# Patient Record
Sex: MALE | Race: WHITE | HISPANIC OR LATINO | Employment: OTHER | URBAN - METROPOLITAN AREA
[De-identification: names, ages, dates, MRNs, and addresses within clinical notes are randomized per-mention and may not be internally consistent; named-entity substitution may affect disease eponyms.]

---

## 2021-02-24 ENCOUNTER — TELEPHONE (OUTPATIENT)
Dept: SCHEDULING | Facility: IMAGING CENTER | Age: 26
End: 2021-02-24

## 2021-03-01 ENCOUNTER — OFFICE VISIT (OUTPATIENT)
Dept: MEDICAL GROUP | Facility: PHYSICIAN GROUP | Age: 26
End: 2021-03-01
Payer: COMMERCIAL

## 2021-03-01 VITALS
HEART RATE: 96 BPM | OXYGEN SATURATION: 98 % | SYSTOLIC BLOOD PRESSURE: 118 MMHG | WEIGHT: 161 LBS | BODY MASS INDEX: 23.1 KG/M2 | DIASTOLIC BLOOD PRESSURE: 74 MMHG | TEMPERATURE: 97.8 F

## 2021-03-01 DIAGNOSIS — F84.0 AUTISM DISORDER: ICD-10-CM

## 2021-03-01 DIAGNOSIS — L70.5 EXCORIATED ACNE: ICD-10-CM

## 2021-03-01 DIAGNOSIS — Z87.898 HISTORY OF SEIZURE: ICD-10-CM

## 2021-03-01 DIAGNOSIS — L70.0 ACNE VULGARIS: ICD-10-CM

## 2021-03-01 DIAGNOSIS — Z13.228 SCREENING FOR METABOLIC DISORDER: ICD-10-CM

## 2021-03-01 PROCEDURE — 99203 OFFICE O/P NEW LOW 30 MIN: CPT | Performed by: STUDENT IN AN ORGANIZED HEALTH CARE EDUCATION/TRAINING PROGRAM

## 2021-03-01 RX ORDER — TRETINOIN 0.5 MG/G
CREAM TOPICAL
Qty: 20 G | Refills: 1 | Status: SHIPPED | OUTPATIENT
Start: 2021-03-01

## 2021-03-01 NOTE — LETTER
[employer] [facility]  Jesus Manuel Matos M.D.  [address] [facility] [address]  [place] [address]  Fax: [phone]   Authorization for Release/Disclosure of   Protected Health Information   Name: NITIN ESPINOZAVALENTINA : 1995 SSN: [id number]   Address: [address] [place] [address] Phone:    [phone] (home)    I authorize the entity listed below to release/disclose the PHI below to:   [facility]/Jesus Manuel Matos M.D. and Sj Leong M.D.   Provider or Entity Name:   Dr. Jesus Manuel Matos   Address   City, [facility], [facility] Phone:      Fax:     Reason for request: continuity of care   Information to be released:    [  ] LAST COLONOSCOPY,  including any PATH REPORT and follow-up  [  ] LAST FIT/COLOGUARD RESULT [  ] LAST DEXA  [  ] LAST MAMMOGRAM  [  ] LAST PAP  [  ] LAST LABS [  ] RETINA EXAM REPORT  [  ] IMMUNIZATION RECORDS  [x] Release- Last 3 office notes.      [  ] Check here and initial the line next to each item to release ALL health information INCLUDING  _____ Care and treatment for drug and / or alcohol abuse  _____ HIV testing, infection status, or AIDS  _____ Genetic Testing    DATES OF SERVICE OR TIME PERIOD TO BE DISCLOSED: _Last 3 notes_____  I understand and acknowledge that:  * This Authorization may be revoked at any time by you in writing, except if your health information has already been used or disclosed.  * Your health information that will be used or disclosed as a result of you signing this authorization could be re-disclosed by the recipient. If this occurs, your re-disclosed health information may no longer be protected by State or Federal laws.  * You may refuse to sign this Authorization. Your refusal will not affect your ability to obtain treatment.  * This Authorization becomes effective upon signing and will  on (date) __________.      If no date is indicated, this Authorization will  one (1) year from the signature date.    Name: Nitin Aguilar    Signature:   Date:          3/1/2021       PLEASE FAX REQUESTED RECORDS BACK TO: [phone]

## 2021-03-01 NOTE — ASSESSMENT & PLAN NOTE
His mother notes that he has previously had seizures, that she describes as general shaking.  However, she also notes that he has had probable headaches, before the onset of this, as he would occasionally use hand signals to indicate that he would want some pain medication (aspirin or tylenol), prior to the onset of the shaking.  He is not currently on any antiseizure medication, and is on risperidone.  They have tried to get into neurology, but has had some difficulty, making appointments and would appreciate a new referral.

## 2021-03-01 NOTE — ASSESSMENT & PLAN NOTE
Patient is currently managed by psychiatry: Dr. Serrano.  He is currently on risperidone, through their office.  The patient is nonverbal, but makes humming noises periodically.  He also tends to use hand signals, to indicate what he wants (to his mother).

## 2021-03-01 NOTE — PROGRESS NOTES
New to Provider (and [facility])      Reason to establish: New patient to establish  Chief Complaint   Patient presents with   • Establish Care   • Acne     tretinoin          HPI:   Cruzito Aguilar is a 25 y.o. male, with autism.  He is accompanied by his mother, and both of his parents are listed as his legal guardians, and legal documents that his mother brought with them.    Prior PCP:  Dr. Jesus Manuel Matos.  ... However, they note that have not seen him for several years, as they had moved to [place], and never really established with a provider out there, but when they returned to [place], were following up with psychiatry (Dr. Serrano).  He has not had other PCP, since Dr. Dinero.      Autism disorder  Patient is currently managed by psychiatry: Dr. Serrano.  He is currently on risperidone, through their office.  The patient is nonverbal, but makes humming noises periodically.  He also tends to use hand signals, to indicate what he wants (to his mother).    History of seizure  His mother notes that he has previously had seizures, that she describes as general shaking.  However, she also notes that he has had probable headaches, before the onset of this, as he would occasionally use hand signals to indicate that he would want some pain medication (aspirin or tylenol), prior to the onset of the shaking.  He is not currently on any antiseizure medication, and is on risperidone.  They have tried to get into neurology, but has had some difficulty, making appointments and would appreciate a new referral.    Acne vulgaris  Patient has recently been on Retin-A, for acne vulgaris.  In office, is noticed on his forehead.  His mother notes that he has been on it, for a while.  However, whenever he stops it, it returns.  There are some mild excoriation marks on it as well.       Review of Symptoms  Not able to be done, by the patient,   given his mental status and medical condition.        Past Medical  History:   Diagnosis Date   • Autism     non verbal since age 2 1/2 years    • History of seizure        History reviewed. No pertinent surgical history.      Family History   Problem Relation Age of Onset   • Hypertension Father    • Diabetes Maternal Grandmother        Social History     Tobacco Use   • Smoking status: Never Smoker   • Smokeless tobacco: Never Used   Substance Use Topics   • Alcohol use: No       Current Outpatient Medications   Medication Sig Dispense Refill   • tretinoin (RETIN-A) 0.05 % cream Apply small amount to acne on face at night. 20 g 1   • Risperidone (RISPERDAL PO) Take  by mouth. 1 mg AM,  3 mg PM - by Psychiatrist.       No current facility-administered medications for this visit.       Allergies as of 03/01/2021   • (No Known Allergies)       Physical Exam  /74 (BP Location: Right arm, Patient Position: Sitting, BP Cuff Size: Adult)   Pulse 96   Temp 36.6 °C (97.8 °F) (Temporal)   Wt 73 kg (161 lb)   SpO2 98%   BMI 23.10 kg/m²   General:  Alert and oriented, No apparent distress.  Eyes:    EOMI (but not following instructions).  No scleral icterus.    Neck:  Supple.  Thyroid not enlarged.   Lungs: Clear to auscultation bilaterally.  No wheezes or rales.  Cardiovascular: Regular rate and rhythm.  No murmurs, rubs or gallops.  Abdomen:  Soft.  Non-tender.   - Large, but not distended.   Psychological: + Nonverbal.  Patient uses hand gestures to indicate to his mother what he wants.  He is fairly well mannered, but with a few humming noises occasionally.  Skin:  + Mild-moderate acne on forehead.  Couple possible scabs/excoriation marks, near the regions.      Labs: No recent labs to review.          Assessment & Plan    1. Autism disorder  Patient with autism and nonverbal.  History by mother.  Currently managed by psychiatry (Dr. Serrano), on risperidone.  Psychiatry to continue to manage.    2. History of seizure  Patient with vague history of seizures.  Will request  outside records, for further evaluation, but it sounds like either seizures, or complex migraine, as he occasionally asks (gestures) for aspirin or Tylenol, prior to the onset of seizures.  Currently on risperidone, which might decrease the threshold for seizures.  Will refer to neurology for further evaluation and management of this issue.  - REFERRAL TO NEUROLOGY  - CBC WITH DIFFERENTIAL; Future  - Comp Metabolic Panel; Future    3. Screening for metabolic disorder  No recent labs, and patient nonverbal to note good ROS, or screening.  Will obtain basic labs, to evaluate for further underlying issues and disorders.  - CBC WITH DIFFERENTIAL; Future  - Comp Metabolic Panel; Future  - Lipid Profile; Future    4. Acne vulgaris  5. Excoriated acne  Patient has acne, and had been fairly well managed on Retin-A in the past.  But, he breaks out, when medication discontinued.  There are some mild excoriation marks and scabbing, to the acne, suggesting that he had scratched them when a problem.  Will refill Retin-A (for now), and refer to dermatology for further evaluation and management.  - tretinoin (RETIN-A) 0.05 % cream; Apply small amount to acne on face at night.  Dispense: 20 g; Refill: 1  - REFERRAL TO DERMATOLOGY      Health Maintenance Review  To be completed on patient's annual / preventative visit.      A computerized dictation system may have been used in this note.    Despite review, there may be some spelling or grammatical errors.    Sj Leong M.D.  3/1/2021

## 2021-03-01 NOTE — ASSESSMENT & PLAN NOTE
Patient has recently been on Retin-A, for acne vulgaris.  In office, is noticed on his forehead.  His mother notes that he has been on it, for a while.  However, whenever he stops it, it returns.  There are some mild excoriation marks on it as well.

## 2021-03-16 LAB
ALBUMIN SERPL-MCNC: 5.1 G/DL (ref 4.1–5.2)
ALBUMIN/GLOB SERPL: 2 {RATIO} (ref 1.2–2.2)
ALP SERPL-CCNC: 63 IU/L (ref 39–117)
ALT SERPL-CCNC: 18 IU/L (ref 0–44)
AST SERPL-CCNC: 15 IU/L (ref 0–40)
BASOPHILS # BLD AUTO: 0 X10E3/UL (ref 0–0.2)
BASOPHILS NFR BLD AUTO: 0 %
BILIRUB SERPL-MCNC: 0.4 MG/DL (ref 0–1.2)
BUN SERPL-MCNC: 14 MG/DL (ref 6–20)
BUN/CREAT SERPL: 14 (ref 9–20)
CALCIUM SERPL-MCNC: 9.6 MG/DL (ref 8.7–10.2)
CHLORIDE SERPL-SCNC: 105 MMOL/L (ref 96–106)
CHOLEST SERPL-MCNC: 152 MG/DL (ref 100–199)
CO2 SERPL-SCNC: 20 MMOL/L (ref 20–29)
CREAT SERPL-MCNC: 1.02 MG/DL (ref 0.76–1.27)
EOSINOPHIL # BLD AUTO: 0.5 X10E3/UL (ref 0–0.4)
EOSINOPHIL NFR BLD AUTO: 6 %
ERYTHROCYTE [DISTWIDTH] IN BLOOD BY AUTOMATED COUNT: 12.3 % (ref 11.6–15.4)
GLOBULIN SER CALC-MCNC: 2.6 G/DL (ref 1.5–4.5)
GLUCOSE SERPL-MCNC: 83 MG/DL (ref 65–99)
HCT VFR BLD AUTO: 46.4 % (ref 37.5–51)
HDLC SERPL-MCNC: 37 MG/DL
HGB BLD-MCNC: 15.5 G/DL (ref 13–17.7)
IMM GRANULOCYTES # BLD AUTO: 0 X10E3/UL (ref 0–0.1)
IMM GRANULOCYTES NFR BLD AUTO: 0 %
IMMATURE CELLS  115398: ABNORMAL
LABORATORY COMMENT REPORT: ABNORMAL
LDLC SERPL CALC-MCNC: 82 MG/DL (ref 0–99)
LYMPHOCYTES # BLD AUTO: 2.4 X10E3/UL (ref 0.7–3.1)
LYMPHOCYTES NFR BLD AUTO: 28 %
MCH RBC QN AUTO: 28.3 PG (ref 26.6–33)
MCHC RBC AUTO-ENTMCNC: 33.4 G/DL (ref 31.5–35.7)
MCV RBC AUTO: 85 FL (ref 79–97)
MONOCYTES # BLD AUTO: 0.7 X10E3/UL (ref 0.1–0.9)
MONOCYTES NFR BLD AUTO: 8 %
MORPHOLOGY BLD-IMP: ABNORMAL
NEUTROPHILS # BLD AUTO: 5 X10E3/UL (ref 1.4–7)
NEUTROPHILS NFR BLD AUTO: 58 %
NRBC BLD AUTO-RTO: ABNORMAL %
PLATELET # BLD AUTO: 278 X10E3/UL (ref 150–450)
POTASSIUM SERPL-SCNC: 4.1 MMOL/L (ref 3.5–5.2)
PROT SERPL-MCNC: 7.7 G/DL (ref 6–8.5)
RBC # BLD AUTO: 5.48 X10E6/UL (ref 4.14–5.8)
SODIUM SERPL-SCNC: 140 MMOL/L (ref 134–144)
TRIGL SERPL-MCNC: 197 MG/DL (ref 0–149)
VLDLC SERPL CALC-MCNC: 33 MG/DL (ref 5–40)
WBC # BLD AUTO: 8.6 X10E3/UL (ref 3.4–10.8)

## 2021-04-30 ENCOUNTER — OFFICE VISIT (OUTPATIENT)
Dept: MEDICAL GROUP | Facility: PHYSICIAN GROUP | Age: 26
End: 2021-04-30
Payer: COMMERCIAL

## 2021-04-30 VITALS
BODY MASS INDEX: 22.68 KG/M2 | HEIGHT: 71 IN | HEART RATE: 82 BPM | TEMPERATURE: 97.8 F | WEIGHT: 162 LBS | OXYGEN SATURATION: 98 % | SYSTOLIC BLOOD PRESSURE: 118 MMHG | DIASTOLIC BLOOD PRESSURE: 68 MMHG

## 2021-04-30 DIAGNOSIS — E78.2 MIXED HYPERLIPIDEMIA: ICD-10-CM

## 2021-04-30 DIAGNOSIS — Z02.89 ENCOUNTER FOR COMPLETION OF FORM WITH PATIENT: ICD-10-CM

## 2021-04-30 DIAGNOSIS — F84.0 AUTISM DISORDER: ICD-10-CM

## 2021-04-30 PROCEDURE — 99214 OFFICE O/P EST MOD 30 MIN: CPT | Performed by: STUDENT IN AN ORGANIZED HEALTH CARE EDUCATION/TRAINING PROGRAM

## 2021-04-30 ASSESSMENT — FIBROSIS 4 INDEX: FIB4 SCORE: 0.33

## 2021-04-30 NOTE — PROGRESS NOTES
"Established Patient     Cruzito Aguilar is a 26 y.o. male.    Chief Complaint   Patient presents with   • Results     lab review                Problems addressed this visit:     This note uses problem-based documentation.  Subjective HPI is included in Assessment & Plan, at bottom of note.   Problem   Encounter for Completion of Form With Patient   Mixed Hyperlipidemia   Autism Disorder                            Past Medical History:   Diagnosis Date   • Autism     non verbal since age 2 1/2 years    • History of seizure        Patient Active Problem List   Diagnosis   • Autism disorder   • History of seizure   • Acne vulgaris   • Encounter for completion of form with patient       History reviewed. No pertinent surgical history.      Family History   Problem Relation Age of Onset   • Hypertension Father    • Diabetes Maternal Grandmother        Social History     Tobacco Use   • Smoking status: Never Smoker   • Smokeless tobacco: Never Used   Substance Use Topics   • Alcohol use: No       Current medications:  (including new changes):  Current Outpatient Medications   Medication Sig Dispense Refill   • tretinoin (RETIN-A) 0.05 % cream Apply small amount to acne on face at night. 20 g 1   • Risperidone (RISPERDAL PO) Take  by mouth. 1 mg AM,  3 mg PM - by Psychiatrist.       No current facility-administered medications for this visit.       Allergies as of 04/30/2021   • (No Known Allergies)                   Review of Symptoms     Unable to complete, due to the patient's mental status.      Physical Exam  /68 (BP Location: Right arm, Patient Position: Sitting, BP Cuff Size: Adult)   Pulse 82   Temp 36.6 °C (97.8 °F) (Temporal)   Ht 1.791 m (5' 10.5\")   Wt 73.5 kg (162 lb)   SpO2 98%   BMI 22.92 kg/m²   General:  Alert and oriented, No apparent distress.  H/E:  PER.  EOMI.  No scleral icterus.    Neck: Trachea midline, no masses, no obvious thyromegaly.  Lungs: Easy / unlabored breathing, without " difficulty.  Good oxygenation.   MSK:  Good general motion of extremities. Normal ambulation.    Psych: Appears to have normal mood and affect.        Labs:  Recent / Prior labs reviewed.    CBC, CMP and Lipids                           Assessment & Plan  (with subjective history and orders):  Of note, the list below is not in a specific nor sortable order.      Problem List Items Addressed This Visit     Autism disorder     Patient is currently managed by psychiatry: Dr. Serrano.  He is currently on risperidone, through their office.  The patient is nonverbal, but makes humming noises periodically.  He also tends to use hand signals, to indicate what he wants (to his mother).    Mother also provides copy of note from prior (pediatric) neurologist verifying past care and assessment, verifying his cognitive and developmental function was well below his neurological age, and was nonverbal.  Additionally unable to read and write.  Therefore, due to his system and social skills, he was having very limited abilities, and his behavior is subject to great fluctuations.    This is consistent with what I have seen in the office, this visit, as well as the prior 1 were reestablished.  He continues to not be able speak, but makes vocalizing sounds, to get attention periodically.  Uses gestures, to try and communicate thoughts to his mother; however, these are not as set language or formalized.  He does not follow verbal instructions, and cannot read or write.    - No change in management today.  - completed form verifying his condition.          Encounter for completion of form with patient     Patient brings in a form that she would like completed, from her insurance company, verifying that the patient is disabled so he can remain on the insurance (as he has turned 26, and would otherwise be off of her insurance).    Completed documentation of form, verifying autism order (F 84.0).  Patient diagnosed originally in [place]  in 1999.  No specific hospitalizations secondary to the autism.  Limited abilities, as noted in autism section.    - Form completed.          Mixed hyperlipidemia     Recent labs with mild mixed hyperlipidemia.  Lipids-152, 197, 37, 82.  Has had mild TG elevation, and low HDL.  Parent notes mild difficulty with diet.  Patient tries to eat what he prefers.  Given the patient's other conditions,   and limitations on diet, this is understandable.  Not on medication at this time.  Too young to calculate ascvd risk.   - can monitor for now.  - Will avoid medications for now.           Of note, the above list is not in a specific nor sortable order.                  Diagnosis Table, with orders:  1. Encounter for completion of form with patient     2. Autism disorder     3. Mixed hyperlipidemia                   Follow-up:  As needed (or 1 yr).               A computerized dictation system may have been used in this note.    Despite review, there may be some errors in spelling or grammar.    Sj Leong M.D.  4/30/2021

## 2021-04-30 NOTE — ASSESSMENT & PLAN NOTE
Recent labs with mild mixed hyperlipidemia.  Lipids-152, 197, 37, 82.  Has had mild TG elevation, and low HDL.  Parent notes mild difficulty with diet.  Patient tries to eat what he prefers.  Given the patient's other conditions,   and limitations on diet, this is understandable.  Not on medication at this time.  Too young to calculate ascvd risk.   - can monitor for now.  - Will avoid medications for now.

## 2021-04-30 NOTE — ASSESSMENT & PLAN NOTE
Patient brings in a form that she would like completed, from her insurance company, verifying that the patient is disabled so he can remain on the insurance (as he has turned 26, and would otherwise be off of her insurance).    Completed documentation of form, verifying autism order (F 84.0).  Patient diagnosed originally in [place] in 1999.  No specific hospitalizations secondary to the autism.  Limited abilities, as noted in autism section.    - Form completed.

## 2021-04-30 NOTE — ASSESSMENT & PLAN NOTE
Patient is currently managed by psychiatry: Dr. Serrano.  He is currently on risperidone, through their office.  The patient is nonverbal, but makes humming noises periodically.  He also tends to use hand signals, to indicate what he wants (to his mother).    Mother also provides copy of note from prior (pediatric) neurologist verifying past care and assessment, verifying his cognitive and developmental function was well below his neurological age, and was nonverbal.  Additionally unable to read and write.  Therefore, due to his system and social skills, he was having very limited abilities, and his behavior is subject to great fluctuations.    This is consistent with what I have seen in the office, this visit, as well as the prior 1 were reestablished.  He continues to not be able speak, but makes vocalizing sounds, to get attention periodically.  Uses gestures, to try and communicate thoughts to his mother; however, these are not as set language or formalized.  He does not follow verbal instructions, and cannot read or write.    - No change in management today.  - completed form verifying his condition.

## 2022-05-16 ENCOUNTER — OFFICE VISIT (OUTPATIENT)
Dept: MEDICAL GROUP | Facility: PHYSICIAN GROUP | Age: 27
End: 2022-05-16
Payer: COMMERCIAL

## 2022-05-16 VITALS
OXYGEN SATURATION: 97 % | DIASTOLIC BLOOD PRESSURE: 64 MMHG | TEMPERATURE: 99.2 F | WEIGHT: 165 LBS | HEART RATE: 91 BPM | SYSTOLIC BLOOD PRESSURE: 118 MMHG | HEIGHT: 70 IN | BODY MASS INDEX: 23.62 KG/M2

## 2022-05-16 DIAGNOSIS — E78.5 DYSLIPIDEMIA: ICD-10-CM

## 2022-05-16 DIAGNOSIS — R46.4 SLOWNESS AND POOR RESPONSIVENESS: ICD-10-CM

## 2022-05-16 DIAGNOSIS — Z87.898 HISTORY OF SEIZURE: ICD-10-CM

## 2022-05-16 PROCEDURE — 99214 OFFICE O/P EST MOD 30 MIN: CPT | Performed by: STUDENT IN AN ORGANIZED HEALTH CARE EDUCATION/TRAINING PROGRAM

## 2022-05-16 ASSESSMENT — FIBROSIS 4 INDEX: FIB4 SCORE: 0.34

## 2022-05-16 NOTE — PATIENT INSTRUCTIONS
Please follow-up with the referral(s) to NEUROLOGY.  You will likely receive a phone call or [employer] message, with information about what office to contact, in order to schedule.  However, if you do not hear from them in the next week or two, please call [phone], and ask for the referral line, to find out the status of the referral.       Please get the labs done in the next few days.  Please get them done while fasting (no food, coffee, or juices, for 8 hours - but water is ok).       If any sudden changes, falls, seizures, or concerns, then please go to the ER.        Please call [phone]) to schedule with a new provider, as I will be leaving soon.  Thank you.

## 2022-05-16 NOTE — PROGRESS NOTES
"Established Patient     Cruzito Aguilar is a 27 y.o. male.    Chief Complaint   Patient presents with   • Follow-Up     Per mom patient pauses and looks tired, then improves.              Problems addressed this visit:     This note uses problem-based documentation.  Subjective HPI is included in Assessment & Plan, at bottom of note.   Problem   Slowness and Poor Responsiveness   Dyslipidemia   History of Seizure                           Past Medical History:   Diagnosis Date   • Autism     non verbal since age 2 1/2 years    • History of seizure        Patient Active Problem List   Diagnosis   • Autism disorder   • History of seizure   • Acne vulgaris   • Encounter for completion of form with patient   • Dyslipidemia   • Slowness and poor responsiveness       No past surgical history on file.    Family History   Problem Relation Age of Onset   • Hypertension Father    • Diabetes Maternal Grandmother        Social History     Tobacco Use   • Smoking status: Never Smoker   • Smokeless tobacco: Never Used   Substance Use Topics   • Alcohol use: No       Current medications:  (including new changes):  Current Outpatient Medications   Medication Sig Dispense Refill   • tretinoin (RETIN-A) 0.05 % cream Apply small amount to acne on face at night. 20 g 1   • Risperidone (RISPERDAL PO) Take  by mouth. 1 mg AM,  3 mg PM - by Psychiatrist.       No current facility-administered medications for this visit.       Allergies as of 05/16/2022   • (No Known Allergies)                   Review of Symptoms     ... NOT able to be reviewed, as patient is non-verbal.       Physical Exam  /64 (BP Location: Right arm, Patient Position: Sitting, BP Cuff Size: Adult)   Pulse 91   Temp 37.3 °C (99.2 °F) (Temporal)   Ht 1.787 m (5' 10.35\")   Wt 74.8 kg (165 lb)   SpO2 97%   BMI 23.44 kg/m²   General:  No apparent distress.    Lungs: Clear to auscultation bilaterally.  No wheezes or rales.  Cardiovascular: Regular rate and rhythm.  "      - No murmurs, rubs or gallops.  Abdomen:  Soft.  No guarding.   Extremities:  No leg edema.  Good general motion of extremities.   Psychological: .. Hums and makes noises occasionally.       Labs:  Last-Prior labs reviewed.    CBC, CMP and Lipids                             Assessment & Plan  (with subjective history and orders):    Problem List Items Addressed This Visit         Slowness and poor responsiveness     Patient is a 27-year-old male, with autism, and is nonverbal at baseline.  (But apparently points and makes gestures, to his mother, to interpret).  ... Today, his mother mentions that over the past few weeks, he did have few episodes of pausing while standing up, causing the mother concerned that he might be tired or about to pass out.…  At which point she would have him sit down, and rest, but the patient has never actually passed out, had syncope, or falling, during any of these episodes.  (And the patient cannot describe them himself.)   ....   Of note, the description by the mother does raise the possibility of absence seizure (as there is an apparent pause, but no obvious shaking).…  But states this is not one of his typical seizures (which she describes more as when waking up, and shaking visibly).  Previously referred to neurology, but have not been able to schedule or be seen yet.  -New referral placed (to neurology, for prior hx of seizures, needing monitoring, and new possible description of an absence seizure?).  -Patient/family advised to follow-up with the new referral.  -Also recommended follow-up with new provider, as I will be leaving the clinic very soon, and will shortly be unavailable to follow-up on this.   -Get new basic labs.          Relevant Orders    Referral to Neurology    CBC WITH DIFFERENTIAL    Comp Metabolic Panel      History of seizure     His mother notes that he has previously had seizures, that she describes as general shaking.  However, she also notes that he  has had probable headaches, before the onset of this, as he would occasionally use hand signals to indicate that he would want some pain medication (aspirin or tylenol), prior to the onset of the shaking.  He is not currently on any antiseizure medication, and is on risperidone.  They have previously tried to get into neurology, but apparently did not follow-up on the prior referral placed, when he established....   - new referral placed to neurology.          Relevant Orders    Referral to Neurology    CBC WITH DIFFERENTIAL    Comp Metabolic Panel      Dyslipidemia    Relevant Orders    Lipid Profile    TSH WITH REFLEX TO FT4                 Diagnosis Table, with orders:  1. Slowness and poor responsiveness  Referral to Neurology    CBC WITH DIFFERENTIAL    Comp Metabolic Panel   2. History of seizure  Referral to Neurology    CBC WITH DIFFERENTIAL    Comp Metabolic Panel   3. Dyslipidemia  Lipid Profile    TSH WITH REFLEX TO FT4                 Follow-up:  Please establish with a new provider,           as I will be leaving the clinic soon.       &   With new neurology referral....              A computerized dictation system may have been used in this note.    Despite review, there may be some errors in spelling or grammar.    Sj Leong M.D.  5/16/2022

## 2022-05-16 NOTE — ASSESSMENT & PLAN NOTE
Patient is a 27-year-old male, with autism, and is nonverbal at baseline.  (But apparently points and makes gestures, to his mother, to interpret).  ... Today, his mother mentions that over the past few weeks, he did have few episodes of pausing while standing up, causing the mother concerned that he might be tired or about to pass out.…  At which point she would have him sit down, and rest, but the patient has never actually passed out, had syncope, or falling, during any of these episodes.  (And the patient cannot describe them himself.)   ....   Of note, the description by the mother does raise the possibility of absence seizure (as there is an apparent pause, but no obvious shaking).…  But states this is not one of his typical seizures (which she describes more as when waking up, and shaking visibly).  Previously referred to neurology, but have not been able to schedule or be seen yet.  -New referral placed (to neurology, for prior hx of seizures, needing monitoring, and new possible description of an absence seizure?).  -Patient/family advised to follow-up with the new referral.  -Also recommended follow-up with new provider, as I will be leaving the clinic very soon, and will shortly be unavailable to follow-up on this.   -Get new basic labs.

## 2022-05-17 NOTE — ASSESSMENT & PLAN NOTE
His mother notes that he has previously had seizures, that she describes as general shaking.  However, she also notes that he has had probable headaches, before the onset of this, as he would occasionally use hand signals to indicate that he would want some pain medication (aspirin or tylenol), prior to the onset of the shaking.  He is not currently on any antiseizure medication, and is on risperidone.  They have previously tried to get into neurology, but apparently did not follow-up on the prior referral placed, when he established....   - new referral placed to neurology.

## 2022-05-19 LAB
ALBUMIN SERPL-MCNC: 4.8 G/DL (ref 4.1–5.2)
ALBUMIN/GLOB SERPL: 2 {RATIO} (ref 1.2–2.2)
ALP SERPL-CCNC: 60 IU/L (ref 44–121)
ALT SERPL-CCNC: 27 IU/L (ref 0–44)
AST SERPL-CCNC: 19 IU/L (ref 0–40)
BASOPHILS # BLD AUTO: 0 X10E3/UL (ref 0–0.2)
BASOPHILS NFR BLD AUTO: 0 %
BILIRUB SERPL-MCNC: 0.6 MG/DL (ref 0–1.2)
BUN SERPL-MCNC: 13 MG/DL (ref 6–20)
BUN/CREAT SERPL: 14 (ref 9–20)
CALCIUM SERPL-MCNC: 9 MG/DL (ref 8.7–10.2)
CHLORIDE SERPL-SCNC: 106 MMOL/L (ref 96–106)
CHOLEST SERPL-MCNC: 140 MG/DL (ref 100–199)
CO2 SERPL-SCNC: 21 MMOL/L (ref 20–29)
CREAT SERPL-MCNC: 0.93 MG/DL (ref 0.76–1.27)
EGFRCR SERPLBLD CKD-EPI 2021: 115 ML/MIN/1.73
EOSINOPHIL # BLD AUTO: 0.3 X10E3/UL (ref 0–0.4)
EOSINOPHIL NFR BLD AUTO: 3 %
ERYTHROCYTE [DISTWIDTH] IN BLOOD BY AUTOMATED COUNT: 12.1 % (ref 11.6–15.4)
GLOBULIN SER CALC-MCNC: 2.4 G/DL (ref 1.5–4.5)
GLUCOSE SERPL-MCNC: 96 MG/DL (ref 65–99)
HCT VFR BLD AUTO: 45.2 % (ref 37.5–51)
HDLC SERPL-MCNC: 37 MG/DL
HGB BLD-MCNC: 15 G/DL (ref 13–17.7)
IMM GRANULOCYTES # BLD AUTO: 0 X10E3/UL (ref 0–0.1)
IMM GRANULOCYTES NFR BLD AUTO: 0 %
IMMATURE CELLS  115398: NORMAL
LABORATORY COMMENT REPORT: ABNORMAL
LDLC SERPL CALC-MCNC: 71 MG/DL (ref 0–99)
LYMPHOCYTES # BLD AUTO: 1.2 X10E3/UL (ref 0.7–3.1)
LYMPHOCYTES NFR BLD AUTO: 13 %
MCH RBC QN AUTO: 28.8 PG (ref 26.6–33)
MCHC RBC AUTO-ENTMCNC: 33.2 G/DL (ref 31.5–35.7)
MCV RBC AUTO: 87 FL (ref 79–97)
MONOCYTES # BLD AUTO: 0.6 X10E3/UL (ref 0.1–0.9)
MONOCYTES NFR BLD AUTO: 7 %
MORPHOLOGY BLD-IMP: NORMAL
NEUTROPHILS # BLD AUTO: 6.6 X10E3/UL (ref 1.4–7)
NEUTROPHILS NFR BLD AUTO: 77 %
NRBC BLD AUTO-RTO: NORMAL %
PLATELET # BLD AUTO: 225 X10E3/UL (ref 150–450)
POTASSIUM SERPL-SCNC: 3.7 MMOL/L (ref 3.5–5.2)
PROT SERPL-MCNC: 7.2 G/DL (ref 6–8.5)
RBC # BLD AUTO: 5.21 X10E6/UL (ref 4.14–5.8)
SODIUM SERPL-SCNC: 143 MMOL/L (ref 134–144)
TRIGL SERPL-MCNC: 187 MG/DL (ref 0–149)
TSH SERPL DL<=0.005 MIU/L-ACNC: 1.06 UIU/ML (ref 0.45–4.5)
VLDLC SERPL CALC-MCNC: 32 MG/DL (ref 5–40)
WBC # BLD AUTO: 8.7 X10E3/UL (ref 3.4–10.8)

## 2022-05-25 ENCOUNTER — OFFICE VISIT (OUTPATIENT)
Dept: MEDICAL GROUP | Facility: PHYSICIAN GROUP | Age: 27
End: 2022-05-25
Payer: COMMERCIAL

## 2022-05-25 VITALS
TEMPERATURE: 98.5 F | BODY MASS INDEX: 24.11 KG/M2 | SYSTOLIC BLOOD PRESSURE: 108 MMHG | HEART RATE: 124 BPM | DIASTOLIC BLOOD PRESSURE: 72 MMHG | OXYGEN SATURATION: 98 % | HEIGHT: 70 IN | WEIGHT: 168.4 LBS | RESPIRATION RATE: 20 BRPM

## 2022-05-25 DIAGNOSIS — F84.0 AUTISM DISORDER: ICD-10-CM

## 2022-05-25 DIAGNOSIS — Z87.898 HISTORY OF SEIZURE: ICD-10-CM

## 2022-05-25 PROCEDURE — 99213 OFFICE O/P EST LOW 20 MIN: CPT | Performed by: FAMILY MEDICINE

## 2022-05-25 RX ORDER — RISPERIDONE 1 MG/1
TABLET ORAL
COMMUNITY
Start: 2022-05-11 | End: 2022-05-25

## 2022-05-25 ASSESSMENT — FIBROSIS 4 INDEX: FIB4 SCORE: 0.44

## 2022-05-25 NOTE — PROGRESS NOTES
Subjective:     CC:   Chief Complaint   Patient presents with   • Establish Care       HPI:   Cruzito presents today to establish care.  Patient has been seeing a previous provider who has left practice.  Patient just recently had lab work done less than a week ago and and his previous provider did let the mother know that everything looks fine.  They have not received information from my chart but looking at his chart he does not have my chart.  So we were able to give him the information to call neurology.  Mother states that since puberty he has been having increased issues with seizures but has not brought him to see anyone.  Apparently mom says that when he has it they just cannot let him sit there for a little while and and then he recovers.  But the mother has noticed that his seizure activity has increased.    Past Medical History:   Diagnosis Date   • Autism     non verbal since age 2 1/2 years    • History of seizure        Social History     Tobacco Use   • Smoking status: Never Smoker   • Smokeless tobacco: Never Used   Vaping Use   • Vaping Use: Never used   Substance Use Topics   • Alcohol use: No   • Drug use: No       Current Outpatient Medications Ordered in Epic   Medication Sig Dispense Refill   • tretinoin (RETIN-A) 0.05 % cream Apply small amount to acne on face at night. 20 g 1   • risperiDONE (RISPERDAL) 1 MG Tab Take  by mouth. 1 mg AM,  3 mg PM - by Psychiatrist.       No current [facility]-ordered facility-administered medications on file.       Allergies:  Patient has no known allergies.    Health Maintenance: Completed    ROS:  Gen: no fevers/chills, no changes in weight  Eyes: no changes in vision  ENT: no sore throat, no hearing loss, no bloody nose  Pulm: no sob, no cough  CV: no chest pain, no palpitations  GI: no nausea/vomiting, no diarrhea  Neuro: no headaches, no numbness/tingling  Heme/Lymph: no easy bruising    Objective:     Exam:  /72 (BP Location: Left arm, Patient Position:  "Sitting, BP Cuff Size: Adult)   Pulse (!) 124   Temp 36.9 °C (98.5 °F) (Temporal)   Resp 20   Ht 1.79 m (5' 10.47\")   Wt 76.4 kg (168 lb 6.4 oz)   SpO2 98%   BMI 23.84 kg/m²  Body mass index is 23.84 kg/m².    Gen: Alert and oriented, No apparent distress.  Skin: Warm and dry.  No obvious lesions.  Eyes: Sclera wnl Pupils normal in size  Lungs: Normal effort, CTA bilaterally, no wheezes, rhonchi, or rales  CV: Regular rate and rhythm. No murmurs, rubs, or gallops.  Musculoskeletal: Normal gait. No extremity cyanosis, clubbing, or edema.  Neuro: Oriented to person, place and time  Psych: Mood is wnl       Assessment & Plan:     27 y.o. male with the following -     1. Autism disorder  Patient has had this condition throughout his life appears that he does do some communication but really only with hand gestures.  This is a chronic problem  2. History of seizure  I am concerned about his seizure activity and would recommend mother contact the number that we gave her if it is going take a little while to let me know.  Also if he has another seizure always bring him to the emergency room.  I also noticed that we have no documentation of his last varicella or Tdap would like mother to bring those records next time he comes in to see me.  This is a chronic problem    Return in about 4 weeks (around 6/22/2022).    Please note that this dictation was created using voice recognition software. I have made every reasonable attempt to correct obvious errors, but I expect that there are errors of grammar and possibly content that I did not discover before finalizing the note.  "

## 2022-06-07 ENCOUNTER — OFFICE VISIT (OUTPATIENT)
Dept: NEUROLOGY | Facility: MEDICAL CENTER | Age: 27
End: 2022-06-07
Attending: PSYCHIATRY & NEUROLOGY
Payer: COMMERCIAL

## 2022-06-07 VITALS
BODY MASS INDEX: 24.21 KG/M2 | HEIGHT: 70 IN | TEMPERATURE: 97.9 F | OXYGEN SATURATION: 97 % | WEIGHT: 169.09 LBS | SYSTOLIC BLOOD PRESSURE: 110 MMHG | HEART RATE: 111 BPM | DIASTOLIC BLOOD PRESSURE: 68 MMHG

## 2022-06-07 DIAGNOSIS — F84.0 AUTISM DISORDER: ICD-10-CM

## 2022-06-07 DIAGNOSIS — Z87.898 HISTORY OF SEIZURE: ICD-10-CM

## 2022-06-07 DIAGNOSIS — G40.802 OTHER EPILEPSY WITHOUT STATUS EPILEPTICUS, NOT INTRACTABLE (HCC): ICD-10-CM

## 2022-06-07 DIAGNOSIS — R46.89 SPELL OF ABNORMAL BEHAVIOR: ICD-10-CM

## 2022-06-07 PROCEDURE — 99204 OFFICE O/P NEW MOD 45 MIN: CPT | Performed by: PSYCHIATRY & NEUROLOGY

## 2022-06-07 PROCEDURE — 99211 OFF/OP EST MAY X REQ PHY/QHP: CPT | Performed by: PSYCHIATRY & NEUROLOGY

## 2022-06-07 ASSESSMENT — FIBROSIS 4 INDEX: FIB4 SCORE: 0.44

## 2022-06-07 NOTE — PATIENT INSTRUCTIONS
I have ordered a EEG to capture the spells.     Try to record a video of his spells and email to me at: [email or web address]

## 2022-06-07 NOTE — PROGRESS NOTES
Chief Complaint   Patient presents with   • New Patient     History of seizures       History of present illness:  Cruzito Aguilar 27 y.o. male with history of autism and history of seizure, now with change in awareness.   When the spells occur, he has shaking of his whole body. These spells are now occurring daily. It can occur up to 2 times daily. It used to be once a month, but since 2 months ago has been more frequent.   He has had history of shaking spells since 14 years of age. It starts with him turning pale and mumbling and he is unable to respond. It lasts for 2 minutes. After the end of the spell, he is weak and is unable to stand up for 10 minutes.   He is non-verbal at baseline.      He is not on anti-seizure medication.   Risperidone is useful for calming down his behavior. He was unable to come off of it before.     Past medical history:   Past Medical History:   Diagnosis Date   • Autism     non verbal since age 2 1/2 years    • History of seizure        Past surgical history:   No past surgical history on file.    Family history:   Family History   Problem Relation Age of Onset   • Arthritis Mother         Rheumatoid   • Arterial Aneurysm Father    • Lung Disease Father    • Heart Disease Father    • Hypertension Father    • Diabetes Maternal Grandmother    • Lung Cancer Maternal Grandfather    • Hypertension Paternal Grandfather    • Multiple Sclerosis Paternal Grandfather        Social history:   Social History     Socioeconomic History   • Marital status: Single     Spouse name: Not on file   • Number of children: Not on file   • Years of education: Not on file   • Highest education level: Not on file   Occupational History   • Not on file   Tobacco Use   • Smoking status: Never Smoker   • Smokeless tobacco: Never Used   Vaping Use   • Vaping Use: Never used   Substance and Sexual Activity   • Alcohol use: No   • Drug use: No   • Sexual activity: Not Currently   Other Topics Concern   • Not on file  "  Social History Narrative   • Not on file     Social Determinants of Health     Financial Resource Strain: Not on file   Food Insecurity: Not on file   Transportation Needs: Not on file   Physical Activity: Not on file   Stress: Not on file   Social Connections: Not on file   Intimate Partner Violence: Not on file   Housing Stability: Not on file       Current medications:   Current Outpatient Medications   Medication   • tretinoin (RETIN-A) 0.05 % cream   • risperiDONE (RISPERDAL) 1 MG Tab     No current facility-administered medications for this visit.       Medication Allergy:  No Known Allergies    Physical examination:   Vitals:    06/07/22 0714   BP: 110/68   BP Location: Left arm   Patient Position: Sitting   BP Cuff Size: Adult   Pulse: (!) 111   Temp: 36.6 °C (97.9 °F)   TempSrc: Temporal   SpO2: 97%   Weight: 76.7 kg (169 lb 1.5 oz)   Height: 1.778 m (5' 10\")     Neurological Exam  Mental Status  Awake and alert.  He is nonverbal.  He is able to follow simple commands..    Cranial Nerves  CN III, IV, VI: Extraocular movements intact bilaterally.  CN VII:  Right: There is no facial weakness.  Left: There is no facial weakness.  CN XI:  Right: Trapezius strength is normal.  Left: Trapezius strength is normal.    Motor                                               Right                     Left   Shoulder abduction               5                          5  Elbow flexion                                                 5  Hip abduction                         5                          5  Knee extension                      5                          5  There is spontaneous movement.  He has multiple automatisms..    Reflexes                                            Right                      Left  Biceps                                 2+                         2+    Coordination  Right: Rapid alternating movement normal.Left: Rapid alternating movement normal.    Gait  Casual gait is normal including " stance, stride, and arm swing.      Labs:  I reviewed the following labs personally:  None    Imaging:   None    ASSESSMENT AND PLAN:  Problem List Items Addressed This Visit     Autism disorder    History of seizure    Relevant Orders    Referral to Neurodiagnostics (EEG,EP,EMG/NCS/DBS)    Spell of abnormal behavior    Relevant Orders    Referral to Neurodiagnostics (EEG,EP,EMG/NCS/DBS)          1. Spell of abnormal behavior  - Referral to Neurodiagnostics (EEG,EP,EMG/NCS/DBS)    2. History of seizure  - Referral to Neurodiagnostics (EEG,EP,EMG/NCS/DBS)    3. Autism disorder    27-year-old male with developmental delay, nonverbal, and documented history of epilepsy presents with abnormal spells.  Majority of the spells occur daily and are described as mumbling, pale skin, and shaking.  The mother is able to get him to drink water, after which the spells typically resolve but he is tired and lethargic.  There also are episodes about once a month of whole body shaking, one of which occurred when he was outside.  I have ordered an ambulatory EEG to capture the spells.  The mother has also been instructed to record 1 of these episodes and email me the video.  I have reviewed 2 medical notes notes from Dr. Fabian.     FOLLOW-UP:   Return in about 3 months (around 9/7/2022).    Total time spent for the day for this patient unrelated to procedure time is: 27 minutes. I spent 21 minutes in face to face time and I spent 1 minutes pre-charting and 5 minutes in post-visit documentation.      Dr. Toni Toth D.O.  [facility]

## 2022-06-13 ENCOUNTER — TELEPHONE (OUTPATIENT)
Dept: NEUROLOGY | Facility: MEDICAL CENTER | Age: 27
End: 2022-06-13
Payer: COMMERCIAL

## 2022-06-13 NOTE — TELEPHONE ENCOUNTER
1. Caller Name: Sparkle Mai             Call Back Number: [phone]    How would the patient prefer to be contacted with a response: Phone call OK to leave a detailed message    Patient mom Sparkle called state that Dr. Toth ordered an EEG test for the patient and she does not think that the patient will tolerate having the cap on his head because of his autism. Mom wanted to find out if there are other alternatives to EEG. Wanted a call back with doctors answer. Please advise. Thank you.RENATE Chand.

## 2022-06-21 NOTE — TELEPHONE ENCOUNTER
Call the mother and inform her that this is the best test to determine if these spells are seizures.    The other option would be to just start him on antiseizure drugs but I am uncertain if these episodes are epileptic without the EEG.    Dr. Toni Toth D.O.  [facility]

## 2022-07-18 ENCOUNTER — APPOINTMENT (OUTPATIENT)
Dept: NEUROLOGY | Facility: MEDICAL CENTER | Age: 27
End: 2022-07-18
Attending: STUDENT IN AN ORGANIZED HEALTH CARE EDUCATION/TRAINING PROGRAM
Payer: COMMERCIAL

## 2022-07-19 ENCOUNTER — APPOINTMENT (OUTPATIENT)
Dept: NEUROLOGY | Facility: MEDICAL CENTER | Age: 27
End: 2022-07-19
Attending: STUDENT IN AN ORGANIZED HEALTH CARE EDUCATION/TRAINING PROGRAM
Payer: COMMERCIAL

## 2022-07-20 ENCOUNTER — APPOINTMENT (OUTPATIENT)
Dept: NEUROLOGY | Facility: MEDICAL CENTER | Age: 27
End: 2022-07-20
Attending: STUDENT IN AN ORGANIZED HEALTH CARE EDUCATION/TRAINING PROGRAM
Payer: COMMERCIAL

## 2022-07-29 ENCOUNTER — OFFICE VISIT (OUTPATIENT)
Dept: MEDICAL GROUP | Facility: PHYSICIAN GROUP | Age: 27
End: 2022-07-29
Payer: COMMERCIAL

## 2022-07-29 VITALS
HEART RATE: 100 BPM | BODY MASS INDEX: 23.82 KG/M2 | HEIGHT: 70 IN | TEMPERATURE: 97.2 F | WEIGHT: 166.4 LBS | SYSTOLIC BLOOD PRESSURE: 124 MMHG | RESPIRATION RATE: 16 BRPM | DIASTOLIC BLOOD PRESSURE: 82 MMHG | OXYGEN SATURATION: 98 %

## 2022-07-29 DIAGNOSIS — Z00.00 WELLNESS EXAMINATION: ICD-10-CM

## 2022-07-29 DIAGNOSIS — Z87.898 HISTORY OF SEIZURE: ICD-10-CM

## 2022-07-29 PROCEDURE — 99213 OFFICE O/P EST LOW 20 MIN: CPT | Performed by: FAMILY MEDICINE

## 2022-07-29 ASSESSMENT — FIBROSIS 4 INDEX: FIB4 SCORE: 0.44

## 2022-07-29 NOTE — PROGRESS NOTES
"Subjective:     CC:   Chief Complaint   Patient presents with   • Follow-Up       HPI:   Cruzito presents today mother's thinks that he has had a couple seizures the neurologist wanted her to tape it but by the time she realizes it the seizures have stopped.  Patient is getting a EEG done next week.  Mother did bring his immunization records looks like he probably needs a chickenpox along with a Tdap.  Mother has no concerns with him at this time    Past Medical History:   Diagnosis Date   • Autism     non verbal since age 2 1/2 years    • History of seizure        Social History     Tobacco Use   • Smoking status: Never Smoker   • Smokeless tobacco: Never Used   Vaping Use   • Vaping Use: Never used   Substance Use Topics   • Alcohol use: No   • Drug use: No       Current Outpatient Medications Ordered in Epic   Medication Sig Dispense Refill   • tretinoin (RETIN-A) 0.05 % cream Apply small amount to acne on face at night. 20 g 1   • risperiDONE (RISPERDAL) 1 MG Tab Take  by mouth. 1 mg AM,  3 mg PM - by Psychiatrist.       No current [facility]-ordered facility-administered medications on file.       Allergies:  Patient has no known allergies.    Health Maintenance: Completed    ROS:  Gen: no fevers/chills, no changes in weight  Eyes: no changes in vision  ENT: no sore throat, no hearing loss, no bloody nose  Pulm: no sob, no cough  CV: no chest pain, no palpitations  GI: no nausea/vomiting, no diarrhea  Neuro: no headaches, no numbness/tingling  Heme/Lymph: no easy bruising    Objective:     Exam:  /82 (BP Location: Right arm, Patient Position: Sitting, BP Cuff Size: Adult)   Pulse 100   Temp 36.2 °C (97.2 °F) (Temporal)   Resp 16   Ht 1.778 m (5' 10\")   Wt 75.5 kg (166 lb 6.4 oz)   SpO2 98%   BMI 23.88 kg/m²  Body mass index is 23.88 kg/m².    Gen: Alert and oriented, No apparent distress.  Skin: Warm and dry.  No obvious lesions.  Eyes: Sclera wnl Pupils normal in size  Lungs: Normal effort, CTA " bilaterally, no wheezes, rhonchi, or rales  CV: Regular rate and rhythm. No murmurs, rubs, or gallops.  Musculoskeletal: Normal gait. No extremity cyanosis, clubbing, or edema.  Neuro: Patient unable to talk but does make sounds  Psych: Mood is wnl       Assessment & Plan:     27 y.o. male with the following -     1. History of seizure  Hold off on immunizations since he is going to begin his EEG wait to after the work-up to that my concern that it may make him have a fever and cause some increased issues.  This is a chronic problem    2. Wellness examination  I would recommend waiting for the varicella and a Tdap until he has his EEG and they make a decision about his seizures mother is very agreeable with this should see him back in a couple months.  Mother may consider and possibly just bring him in as MA time at that time.       Return in about 2 months (around 9/29/2022).    Please note that this dictation was created using voice recognition software. I have made every reasonable attempt to correct obvious errors, but I expect that there are errors of grammar and possibly content that I did not discover before finalizing the note.

## 2022-08-01 ENCOUNTER — NON-PROVIDER VISIT (OUTPATIENT)
Dept: NEUROLOGY | Facility: MEDICAL CENTER | Age: 27
End: 2022-08-01
Attending: STUDENT IN AN ORGANIZED HEALTH CARE EDUCATION/TRAINING PROGRAM
Payer: COMMERCIAL

## 2022-08-01 DIAGNOSIS — R46.89 SPELL OF ABNORMAL BEHAVIOR: ICD-10-CM

## 2022-08-01 DIAGNOSIS — Z87.898 HISTORY OF SEIZURE: ICD-10-CM

## 2022-08-01 PROCEDURE — 95700 EEG CONT REC W/VID EEG TECH: CPT | Performed by: PSYCHIATRY & NEUROLOGY

## 2022-08-01 PROCEDURE — 95719 EEG PHYS/QHP EA INCR W/O VID: CPT | Performed by: PSYCHIATRY & NEUROLOGY

## 2022-08-01 PROCEDURE — 95708 EEG WO VID EA 12-26HR UNMNTR: CPT | Performed by: PSYCHIATRY & NEUROLOGY

## 2022-08-01 NOTE — PROCEDURES
24 HR AMBULATORY ELECTROENCEPHALOGRAM REPORT      Referring provider:     DOS:   8/1/2022 with total duration 23 hours and 11 minutes.       INDICATION:  Cruzito Aguilar 27 y.o. male presenting with seizure     CURRENT ANTIEPILEPTIC REGIMEN: none      TECHNIQUE: A 30-channel, 23 hrs ambulatory electroencephalogram (aEEG) was performed in accordance with the international 10-20 system. This digital study was reviewed in bipolar and referential montages. The recording examined the patient during wakeful, drowsy and sleep states.     The EEG was set up and taken down by a Neurodiagnostic technologist who performed education to patient and staff.     A minimum but not limited to 23 electrodes and 23 channel recording was setup and performed by Neurodiagnostic technologist.    Impedances, electrode integrity, and technical impressions were documented a minimum of every 2-24 hour period by a Neurodiagnostic Technologist and reviewed by Interpreting physician.       DESCRIPTION OF THE RECORD:  During the awake state, background shows symmetrical 10 Hz alpha activity posteriorly with amplitude of 70 mV.  There was reactivity with eye opening/closure.  Normal anterior-posterior gradient was noted with faster beta frequencies seen anteriorly.  During drowsiness, high-amplitude delta frequencies were seen.    During the sleep state, background shows diffuse high-amplitude 4-5 Hz delta activity.  Symmetrical high-amplitude sleep spindles and vertex sharp activities were seen in the central leads.    ACTIVATION PROCEDURES:   hyperventilation was not performed    Intermittent Photic stimulation was performed in a stepwise fashion from 1 to 30 Hz and elicited a normal response (photic driving), most noticeable in the posterior leads.    ICTAL AND/OR INTERICTAL FINDINGS:   Frequent left temporal epileptiform activities were noted, at times LPDs at 1 Hz were noted.No regional slowing was seen during this study.     EVENT(S):  4  "episodes of \"mumbling \" were reported, with ictal correlate at   1) 14:06 there was diffuse background attenuation for 3 seconds followed by diffuse possible left hemisphere? Onset spike and wave discharges with duration ~ 60 seconds.   2) at 16:01, right hemisphere sharply contoured rhythmic theta quickly spread to midline and left hemisphere, with duration ~70 seconds  3) at 06:11, onset likely left hemisphere, similar to seizure #1, duration~50 seconds.      4) Episode of mumbling only at 02:30, noted transition from sleep to wakefulness. no ictal EEG correlate, no seizure seen.     5) One electrographic seizure noted at  19:22, onset was unclear due to excessive artifact, ~50 seconds, no clinical report.   Sinus tachycardia with all electrographic seizure.     EKG: sampling review of EKG recording demonstrated sinus rhythm.       INTERPRETATION:   This is an abnormal 23 hours ambulatory electroencephalogram recording in the awake, drowsy and sleep state.    1) the presence of frequent left temporal epileptiform activities were noted, at times LPDs suggests increased risk of focal onset seizure over this region.   2) total 4 episodes of \"mumbling \" were reported, 3 episodes with ictal correlate, 2 with possible left hemisphere onset and 1 with  right hemisphere onset. 1 did not have ictal EEG correlate, instead transition from sleep to wakefulness was noted.   3)1 electrographic seizure noted at  19:22, onset was unclear due to excessive artifact, ~50 seconds, no clinical report.   4) Sinus tachycardia with all 4 electrographic seizures. Clinical correlation is recommended.    Dustin Perdomo MD  Diplomate in Neurology&Epilepsy  Office: [phone]  Fax: [phone]    "

## 2022-08-02 ENCOUNTER — NON-PROVIDER VISIT (OUTPATIENT)
Dept: NEUROLOGY | Facility: MEDICAL CENTER | Age: 27
End: 2022-08-02
Attending: STUDENT IN AN ORGANIZED HEALTH CARE EDUCATION/TRAINING PROGRAM
Payer: COMMERCIAL

## 2022-08-02 DIAGNOSIS — G40.109 TEMPORAL LOBE EPILEPSY (HCC): ICD-10-CM

## 2022-08-02 DIAGNOSIS — G40.409 OTHER GENERALIZED EPILEPSY, NOT INTRACTABLE, WITHOUT STATUS EPILEPTICUS (HCC): ICD-10-CM

## 2022-08-02 DIAGNOSIS — R46.89 SPELL OF ABNORMAL BEHAVIOR: ICD-10-CM

## 2022-08-02 DIAGNOSIS — Z87.898 HISTORY OF SEIZURE: ICD-10-CM

## 2022-08-02 PROCEDURE — 95708 EEG WO VID EA 12-26HR UNMNTR: CPT | Performed by: PSYCHIATRY & NEUROLOGY

## 2022-08-02 PROCEDURE — 95719 EEG PHYS/QHP EA INCR W/O VID: CPT | Performed by: PSYCHIATRY & NEUROLOGY

## 2022-08-02 RX ORDER — LEVETIRACETAM 500 MG/1
500 TABLET ORAL 2 TIMES DAILY
Qty: 180 TABLET | Refills: 2 | Status: SHIPPED | OUTPATIENT
Start: 2022-08-02 | End: 2023-04-11

## 2022-08-02 NOTE — PROCEDURES
"24 HR AMBULATORY ELECTROENCEPHALOGRAM REPORT      Referring provider:     DOS:   8/22022 with total duration 23 hours and 40 minutes.       INDICATION:  Cruzito Aguilar 27 y.o. male presenting with seizure     CURRENT ANTIEPILEPTIC REGIMEN: none      TECHNIQUE: A 30-channel, 23 hrs ambulatory electroencephalogram (aEEG) was performed in accordance with the international 10-20 system. This digital study was reviewed in bipolar and referential montages. The recording examined the patient during wakeful, drowsy and sleep states.     The EEG was set up and taken down by a Neurodiagnostic technologist who performed education to patient and staff.     A minimum but not limited to 23 electrodes and 23 channel recording was setup and performed by Neurodiagnostic technologist.    Impedances, electrode integrity, and technical impressions were documented a minimum of every 2-24 hour period by a Neurodiagnostic Technologist and reviewed by Interpreting physician.       DESCRIPTION OF THE RECORD  Same as previously recorded.     ACTIVATION PROCEDURES:   None     ICTAL AND/OR INTERICTAL FINDINGS:   Frequent left temporal epileptiform activities were noted, at times LPDs at 1 Hz were noted.No regional slowing was seen during this study.     EVENT(S):1 episode of \"mumbling \" was reported at 11:37 with ictal correlate as diffuse background attenuation for 3 seconds followed by diffuse possible left hemisphere? Onset spike and wave discharges with duration ~ 60 seconds.   Sinus tachycardia was noted during the seizure.      EKG: sampling review of EKG recording demonstrated sinus rhythm.       INTERPRETATION:   This is an abnormal 23 hours ambulatory electroencephalogram recording in the awake, drowsy and sleep state.    1) the presence of frequent left temporal epileptiform activities were noted, at times LPDs suggests increased risk of focal onset seizure over this region.   2) 1 episode of \"mumbling \" was reported, with ictal " correlate as possible left hemisphere onset electrographic seizure with duration ~60 seconds.     Dustin Perdomo MD  Diplomate in Neurology&Epilepsy  Office: [phone]  Fax: [phone]

## 2022-08-03 ENCOUNTER — NON-PROVIDER VISIT (OUTPATIENT)
Dept: NEUROLOGY | Facility: MEDICAL CENTER | Age: 27
End: 2022-08-03
Attending: STUDENT IN AN ORGANIZED HEALTH CARE EDUCATION/TRAINING PROGRAM
Payer: COMMERCIAL

## 2022-08-03 PROCEDURE — 99999 PR NO CHARGE: CPT | Performed by: PSYCHIATRY & NEUROLOGY

## 2022-09-09 ENCOUNTER — OFFICE VISIT (OUTPATIENT)
Dept: NEUROLOGY | Facility: MEDICAL CENTER | Age: 27
End: 2022-09-09
Attending: PSYCHIATRY & NEUROLOGY
Payer: COMMERCIAL

## 2022-09-09 VITALS
BODY MASS INDEX: 23.18 KG/M2 | DIASTOLIC BLOOD PRESSURE: 70 MMHG | WEIGHT: 165.57 LBS | HEIGHT: 71 IN | TEMPERATURE: 97.6 F | SYSTOLIC BLOOD PRESSURE: 122 MMHG | HEART RATE: 107 BPM | OXYGEN SATURATION: 98 %

## 2022-09-09 DIAGNOSIS — G40.209 PARTIAL SYMPTOMATIC EPILEPSY WITH COMPLEX PARTIAL SEIZURES, NOT INTRACTABLE, WITHOUT STATUS EPILEPTICUS (HCC): ICD-10-CM

## 2022-09-09 PROBLEM — Z87.898 HISTORY OF SEIZURE: Status: RESOLVED | Noted: 2021-03-01 | Resolved: 2022-09-09

## 2022-09-09 PROBLEM — G40.909 EPILEPSY (HCC): Status: ACTIVE | Noted: 2022-09-09

## 2022-09-09 PROBLEM — R46.89 SPELL OF ABNORMAL BEHAVIOR: Status: RESOLVED | Noted: 2022-06-07 | Resolved: 2022-09-09

## 2022-09-09 PROCEDURE — 99211 OFF/OP EST MAY X REQ PHY/QHP: CPT | Performed by: PSYCHIATRY & NEUROLOGY

## 2022-09-09 PROCEDURE — 99213 OFFICE O/P EST LOW 20 MIN: CPT | Performed by: PSYCHIATRY & NEUROLOGY

## 2022-09-09 ASSESSMENT — FIBROSIS 4 INDEX: FIB4 SCORE: 0.44

## 2022-09-09 NOTE — PROGRESS NOTES
"Chief Complaint   Patient presents with    Follow-Up     Epilepsy       History of present illness:  Cruzito Aguilar 27 y.o. male with developmental delay, nonverbal, and documented history of epilepsy presents with abnormal spells since May. He has had history of seizures since age 14 but has never been on antiepileptic therapy. Majority of the spells occur daily and are described as mumbling, pale skin, and shaking.  The mother is able to get him to drink water, after which the spells typically resolve but he is tired and lethargic.  There also are episodes about once a month of whole body shaking, one of which occurred when he was outside.   EEG was positive for ictal correlate of \"mumbling\", with left hemispheric onset seizure.   He was started on keppra 500mg twice daily after the EEG results.   After starting keppra, he has not had any further mumbling spells.     Past medical history:   Past Medical History:   Diagnosis Date    Autism     non verbal since age 2 1/2 years     History of seizure        Past surgical history:   No past surgical history on file.    Family history:   Family History   Problem Relation Age of Onset    Arthritis Mother         Rheumatoid    Arterial Aneurysm Father     Lung Disease Father     Heart Disease Father     Hypertension Father     Diabetes Maternal Grandmother     Lung Cancer Maternal Grandfather     Hypertension Paternal Grandfather     Multiple Sclerosis Paternal Grandfather        Social history:   Social History     Socioeconomic History    Marital status: Single     Spouse name: Not on file    Number of children: Not on file    Years of education: Not on file    Highest education level: Not on file   Occupational History    Not on file   Tobacco Use    Smoking status: Never    Smokeless tobacco: Never   Vaping Use    Vaping Use: Never used   Substance and Sexual Activity    Alcohol use: No    Drug use: No    Sexual activity: Not Currently   Other Topics Concern    Not on " "file   Social History Narrative    Not on file     Social Determinants of Health     Financial Resource Strain: Not on file   Food Insecurity: Not on file   Transportation Needs: Not on file   Physical Activity: Not on file   Stress: Not on file   Social Connections: Not on file   Intimate Partner Violence: Not on file   Housing Stability: Not on file       Current medications:   Current Outpatient Medications   Medication    levETIRAcetam (KEPPRA) 500 MG Tab    tretinoin (RETIN-A) 0.05 % cream    risperiDONE (RISPERDAL) 1 MG Tab     No current facility-administered medications for this visit.       Medication Allergy:  Allergies   Allergen Reactions    [employer] Food Allergy        Physical examination:   Vitals:    09/09/22 0717   BP: 122/70   BP Location: Right arm   Patient Position: Sitting   BP Cuff Size: Adult   Pulse: (!) 107   Temp: 36.4 °C (97.6 °F)   TempSrc: Temporal   SpO2: 98%   Weight: 75.1 kg (165 lb 9.1 oz)   Height: 1.803 m (5' 11\")     Labs:  I reviewed the following labs personally:  None     Imaging:   This is an abnormal 23 hours ambulatory electroencephalogram recording in the awake, drowsy and sleep state.    1) the presence of frequent left temporal epileptiform activities were noted, at times LPDs suggests increased risk of focal onset seizure over this region.   2) 1 episode of \"mumbling \" was reported, with ictal correlate as possible left hemisphere onset electrographic seizure with duration ~60 seconds.     ASSESSMENT AND PLAN:  Problem List Items Addressed This Visit       Epilepsy (HCC)       1. Partial symptomatic epilepsy with complex partial seizures, not intractable, without status epilepticus (HCC)    27-year-old male with developmental delay autism, nonverbal.  He was having abnormal spells where he was mumbling and shaking.  An ambulatory EEG captured one of the spells and it was epileptic with left hemispheric onset.  The spells are now resolved after starting Keppra.  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  Patient will follow up in 6 months or if symptoms worsen.    FOLLOW-UP:   Return in about 6 months (around 3/9/2023).    Total time spent for the day for this patient unrelated to procedure time is: 20 minutes. I spent 14 minutes in face to face time and I spent 3 minutes pre-charting and 3 minutes in post-visit documentation.      Dr. Toni Toth D.O.  [facility]

## 2023-05-01 ENCOUNTER — OFFICE VISIT (OUTPATIENT)
Dept: MEDICAL GROUP | Facility: PHYSICIAN GROUP | Age: 28
End: 2023-05-01
Payer: MEDICARE

## 2023-05-01 VITALS
HEART RATE: 90 BPM | TEMPERATURE: 98.1 F | DIASTOLIC BLOOD PRESSURE: 62 MMHG | RESPIRATION RATE: 18 BRPM | SYSTOLIC BLOOD PRESSURE: 104 MMHG | WEIGHT: 172.2 LBS | OXYGEN SATURATION: 99 % | BODY MASS INDEX: 24.65 KG/M2 | HEIGHT: 70 IN

## 2023-05-01 DIAGNOSIS — Z00.00 WELLNESS EXAMINATION: ICD-10-CM

## 2023-05-01 DIAGNOSIS — E78.5 DYSLIPIDEMIA: ICD-10-CM

## 2023-05-01 DIAGNOSIS — G40.209 PARTIAL SYMPTOMATIC EPILEPSY WITH COMPLEX PARTIAL SEIZURES, NOT INTRACTABLE, WITHOUT STATUS EPILEPTICUS (HCC): ICD-10-CM

## 2023-05-01 PROCEDURE — 99213 OFFICE O/P EST LOW 20 MIN: CPT | Performed by: FAMILY MEDICINE

## 2023-05-01 ASSESSMENT — FIBROSIS 4 INDEX: FIB4 SCORE: 0.46

## 2023-05-01 NOTE — PROGRESS NOTES
Subjective:     CC:   Chief Complaint   Patient presents with    Follow-Up       HPI:   Cruzito presents today with his father and sister.  Patient does have autism and seizure disorder patient's mother has recently passed away and the father is having a hard time taking care of him.  Father states he is looking for a placement for his son.  Father does state that he has had some breakthrough seizures he thinks it is because his son may be chewing versus swallowing the tablets.  I did recommend they discuss this with the neurologist to make a follow-up appointment.  Patient is due for some lab work in a tetanus shot but at this time the father would like to wait on this.  The father does state that the patient likes to eat a lot and he finds food they try to hide.    Past Medical History:   Diagnosis Date    Autism     non verbal since age 2 1/2 years     History of seizure        Social History     Tobacco Use    Smoking status: Never    Smokeless tobacco: Never   Vaping Use    Vaping Use: Never used   Substance Use Topics    Alcohol use: No    Drug use: No       Current Outpatient Medications Ordered in Epic   Medication Sig Dispense Refill    levETIRAcetam (KEPPRA) 500 MG Tab Take 1 Tablet by mouth 2 times a day. 180 Tablet 2    tretinoin (RETIN-A) 0.05 % cream Apply small amount to acne on face at night. 20 g 1    risperiDONE (RISPERDAL) 1 MG Tab Take  by mouth. 1 mg AM,  3 mg PM - by Psychiatrist.       No current [facility]-ordered facility-administered medications on file.       Allergies:  Tree nuts food allergy    Health Maintenance: Completed    ROS:  Gen: no fevers/chills, patient has gained some weight since have last seen him  Eyes: no changes in vision  ENT: no sore throat, no hearing loss, no bloody nose  Pulm: no sob, no cough  CV: no chest pain, no palpitations  GI: no nausea/vomiting, no diarrhea  Neuro: no headaches, no numbness/tingling  Heme/Lymph: no easy bruising    Objective:     Exam:  /62 (BP  "Location: Left arm, Patient Position: Sitting, BP Cuff Size: Adult)   Pulse 90   Temp 36.7 °C (98.1 °F) (Temporal)   Resp 18   Ht 1.778 m (5' 10\")   Wt 78.1 kg (172 lb 3.2 oz)   SpO2 99%   BMI 24.71 kg/m²  Body mass index is 24.71 kg/m².    Gen: Alert and oriented, No apparent distress.  Skin: Warm and dry.  No obvious lesions.  Eyes: Sclera wnl Pupils normal in size  Lungs: Normal effort, CTA bilaterally, no wheezes, rhonchi, or rales  CV: Regular rate and rhythm. No murmurs, rubs, or gallops.  ABD: Soft non-tender no organomegaly  Musculoskeletal: Normal gait. No extremity cyanosis, clubbing, or edema.  Neuro: Patient is unable to vocalize other than making sounds.  Patient is able to follow instructions  Psych: Mood is wnl       Assessment & Plan:     28 y.o. male with the following -     1. Wellness examination  Father would like to wait on doing any blood testing and also immunizations.  I would recommend they go ahead and make a follow-up appointment with neurology and I should see him back since I probably will need to be doing some paperwork if he does get placed in a care facility.    2. Partial symptomatic epilepsy with complex partial seizures, not intractable, without status epilepticus (HCC)  Patient to follow-up with neurology    3. Dyslipidemia  His father will let me know when they are ready to get blood work done  HCC Gap Form    Diagnosis to address: G40.209 - Partial symptomatic epilepsy with complex partial seizures, not intractable, without status epilepticus (HCC)  Assessment and plan: Chronic, stable. Continue with current defined treatment plan: Father states that he is having occasional seizures he thinks is because of the way he is taking his medication.  I did recommend he contact neurology for follow-up appointment. Follow-up at least annually.  Last edited 05/01/23 15:51 PDT by Elizabeth Lopez M.D.            Return in about 2 months (around 7/1/2023), or if symptoms worsen or fail " to improve.    Please note that this dictation was created using voice recognition software. I have made every reasonable attempt to correct obvious errors, but I expect that there are errors of grammar and possibly content that I did not discover before finalizing the note.

## 2023-05-05 DIAGNOSIS — Z00.00 WELLNESS EXAMINATION: ICD-10-CM

## 2023-05-05 DIAGNOSIS — R63.5 WEIGHT GAIN: ICD-10-CM

## 2023-05-05 DIAGNOSIS — E78.5 DYSLIPIDEMIA: ICD-10-CM

## 2023-05-09 ENCOUNTER — HOSPITAL ENCOUNTER (OUTPATIENT)
Dept: LAB | Facility: MEDICAL CENTER | Age: 28
End: 2023-05-09
Attending: FAMILY MEDICINE
Payer: MEDICARE

## 2023-05-09 ENCOUNTER — NON-PROVIDER VISIT (OUTPATIENT)
Dept: MEDICAL GROUP | Facility: PHYSICIAN GROUP | Age: 28
End: 2023-05-09
Payer: MEDICARE

## 2023-05-09 DIAGNOSIS — Z23 NEED FOR VACCINATION: ICD-10-CM

## 2023-05-09 DIAGNOSIS — E78.5 DYSLIPIDEMIA: ICD-10-CM

## 2023-05-09 DIAGNOSIS — R63.5 WEIGHT GAIN: ICD-10-CM

## 2023-05-09 DIAGNOSIS — Z00.00 WELLNESS EXAMINATION: ICD-10-CM

## 2023-05-09 LAB
ALBUMIN SERPL BCP-MCNC: 4.6 G/DL (ref 3.2–4.9)
ALBUMIN/GLOB SERPL: 1.5 G/DL
ALP SERPL-CCNC: 66 U/L (ref 30–99)
ALT SERPL-CCNC: 24 U/L (ref 2–50)
ANION GAP SERPL CALC-SCNC: 12 MMOL/L (ref 7–16)
AST SERPL-CCNC: 19 U/L (ref 12–45)
BASOPHILS # BLD AUTO: 0.2 % (ref 0–1.8)
BASOPHILS # BLD: 0.03 K/UL (ref 0–0.12)
BILIRUB SERPL-MCNC: 0.7 MG/DL (ref 0.1–1.5)
BUN SERPL-MCNC: 12 MG/DL (ref 8–22)
CALCIUM ALBUM COR SERPL-MCNC: 8.6 MG/DL (ref 8.5–10.5)
CALCIUM SERPL-MCNC: 9.1 MG/DL (ref 8.5–10.5)
CHLORIDE SERPL-SCNC: 104 MMOL/L (ref 96–112)
CHOLEST SERPL-MCNC: 118 MG/DL (ref 100–199)
CO2 SERPL-SCNC: 23 MMOL/L (ref 20–33)
CREAT SERPL-MCNC: 0.82 MG/DL (ref 0.5–1.4)
EOSINOPHIL # BLD AUTO: 0.48 K/UL (ref 0–0.51)
EOSINOPHIL NFR BLD: 3.9 % (ref 0–6.9)
ERYTHROCYTE [DISTWIDTH] IN BLOOD BY AUTOMATED COUNT: 37.3 FL (ref 35.9–50)
FASTING STATUS PATIENT QL REPORTED: NORMAL
GFR SERPLBLD CREATININE-BSD FMLA CKD-EPI: 123 ML/MIN/1.73 M 2
GLOBULIN SER CALC-MCNC: 3 G/DL (ref 1.9–3.5)
GLUCOSE SERPL-MCNC: 94 MG/DL (ref 65–99)
HCT VFR BLD AUTO: 45.2 % (ref 42–52)
HDLC SERPL-MCNC: 37 MG/DL
HGB BLD-MCNC: 14.7 G/DL (ref 14–18)
IMM GRANULOCYTES # BLD AUTO: 0.18 K/UL (ref 0–0.11)
IMM GRANULOCYTES NFR BLD AUTO: 1.5 % (ref 0–0.9)
LDLC SERPL CALC-MCNC: 57 MG/DL
LYMPHOCYTES # BLD AUTO: 1.37 K/UL (ref 1–4.8)
LYMPHOCYTES NFR BLD: 11.1 % (ref 22–41)
MCH RBC QN AUTO: 27.7 PG (ref 27–33)
MCHC RBC AUTO-ENTMCNC: 32.5 G/DL (ref 33.7–35.3)
MCV RBC AUTO: 85.3 FL (ref 81.4–97.8)
MONOCYTES # BLD AUTO: 1.12 K/UL (ref 0–0.85)
MONOCYTES NFR BLD AUTO: 9 % (ref 0–13.4)
NEUTROPHILS # BLD AUTO: 9.2 K/UL (ref 1.82–7.42)
NEUTROPHILS NFR BLD: 74.3 % (ref 44–72)
NRBC # BLD AUTO: 0 K/UL
NRBC BLD-RTO: 0 /100 WBC
PLATELET # BLD AUTO: 251 K/UL (ref 164–446)
PMV BLD AUTO: 11.6 FL (ref 9–12.9)
POTASSIUM SERPL-SCNC: 4.2 MMOL/L (ref 3.6–5.5)
PROT SERPL-MCNC: 7.6 G/DL (ref 6–8.2)
RBC # BLD AUTO: 5.3 M/UL (ref 4.7–6.1)
SODIUM SERPL-SCNC: 139 MMOL/L (ref 135–145)
TRIGL SERPL-MCNC: 120 MG/DL (ref 0–149)
TSH SERPL DL<=0.005 MIU/L-ACNC: 0.96 UIU/ML (ref 0.38–5.33)
WBC # BLD AUTO: 12.4 K/UL (ref 4.8–10.8)

## 2023-05-09 PROCEDURE — 90471 IMMUNIZATION ADMIN: CPT | Performed by: FAMILY MEDICINE

## 2023-05-09 PROCEDURE — 84443 ASSAY THYROID STIM HORMONE: CPT

## 2023-05-09 PROCEDURE — 80053 COMPREHEN METABOLIC PANEL: CPT

## 2023-05-09 PROCEDURE — 90715 TDAP VACCINE 7 YRS/> IM: CPT | Performed by: FAMILY MEDICINE

## 2023-05-09 PROCEDURE — 80061 LIPID PANEL: CPT

## 2023-05-09 PROCEDURE — 85025 COMPLETE CBC W/AUTO DIFF WBC: CPT

## 2023-05-09 PROCEDURE — 36415 COLL VENOUS BLD VENIPUNCTURE: CPT

## 2023-05-09 NOTE — PROGRESS NOTES
"Cruzito Aguilar is a 28 y.o. male here for a non-provider visit for:   TDAP    Reason for immunization: needed for work/school  Immunization records indicate need for vaccine: Yes, confirmed with Epic  Minimum interval has been met for this vaccine: Yes  ABN completed: Yes    VIS Dated  08/06/2021 was given to patient: Yes  All IAC Questionnaire questions were answered \"No.\"    Patient tolerated injection and no adverse effects were observed or reported: Yes    Pt scheduled for next dose in series: No  "

## 2023-06-28 ENCOUNTER — DOCUMENTATION (OUTPATIENT)
Dept: HEALTH INFORMATION MANAGEMENT | Facility: OTHER | Age: 28
End: 2023-06-28
Payer: MEDICARE

## 2023-07-03 ENCOUNTER — OFFICE VISIT (OUTPATIENT)
Dept: MEDICAL GROUP | Facility: PHYSICIAN GROUP | Age: 28
End: 2023-07-03
Payer: MEDICARE

## 2023-07-03 VITALS
HEART RATE: 93 BPM | DIASTOLIC BLOOD PRESSURE: 70 MMHG | WEIGHT: 176 LBS | RESPIRATION RATE: 16 BRPM | TEMPERATURE: 98.2 F | OXYGEN SATURATION: 98 % | BODY MASS INDEX: 25.2 KG/M2 | SYSTOLIC BLOOD PRESSURE: 112 MMHG | HEIGHT: 70 IN

## 2023-07-03 DIAGNOSIS — B35.4 TINEA CORPORIS: ICD-10-CM

## 2023-07-03 DIAGNOSIS — L70.0 ACNE VULGARIS: ICD-10-CM

## 2023-07-03 PROCEDURE — 99214 OFFICE O/P EST MOD 30 MIN: CPT | Performed by: PHYSICIAN ASSISTANT

## 2023-07-03 PROCEDURE — 3074F SYST BP LT 130 MM HG: CPT | Performed by: PHYSICIAN ASSISTANT

## 2023-07-03 PROCEDURE — 3078F DIAST BP <80 MM HG: CPT | Performed by: PHYSICIAN ASSISTANT

## 2023-07-03 RX ORDER — KETOCONAZOLE 20 MG/G
CREAM TOPICAL
Qty: 60 G | Refills: 2 | Status: SHIPPED | OUTPATIENT
Start: 2023-07-03

## 2023-07-03 RX ORDER — NYSTATIN 100000 [USP'U]/G
1 POWDER TOPICAL 3 TIMES DAILY PRN
Qty: 60 G | Refills: 2 | Status: SHIPPED
Start: 2023-07-03 | End: 2023-07-03

## 2023-07-03 RX ORDER — NYSTATIN 100000 [USP'U]/G
1 POWDER TOPICAL 3 TIMES DAILY PRN
Qty: 60 G | Refills: 2 | Status: SHIPPED | OUTPATIENT
Start: 2023-07-03 | End: 2024-03-14

## 2023-07-03 ASSESSMENT — FIBROSIS 4 INDEX: FIB4 SCORE: 0.43

## 2023-07-03 NOTE — PROGRESS NOTES
"Subjective:     CC: rash    HPI:   Cruzito presents today with caregiver for evaluation of a rash he has been experiencing.  Patient is currently living in a group home and caregivers noticed a rash on his groin area and genitals over the last month-6 weeks.  State is very itchy.  Using vaseline and aveeno lotion which has not helped.     They were previously given a prescription for Retin-A which has not been working for his acne. State he has had acne all over his body and they are not sure if it is an allergic reaction.    Past Medical History:   Diagnosis Date    Autism     non verbal since age 2 1/2 years     History of seizure        Social History     Tobacco Use    Smoking status: Never    Smokeless tobacco: Never   Vaping Use    Vaping Use: Never used   Substance Use Topics    Alcohol use: No    Drug use: No       Current Outpatient Medications Ordered in Epic   Medication Sig Dispense Refill    ketoconazole (NIZORAL) 2 % Cream Apply thin layer to affected area twice daily as needed for rash 60 g 2    nystatin (MYCOSTATIN) powder Apply 1 g topically 3 times a day as needed (rash (apply after showering)). 60 g 2    levETIRAcetam (KEPPRA) 500 MG Tab Take 1 Tablet by mouth 2 times a day. 180 Tablet 2    tretinoin (RETIN-A) 0.05 % cream Apply small amount to acne on face at night. 20 g 1    risperiDONE (RISPERDAL) 1 MG Tab Take  by mouth. 1 mg AM,  3 mg PM - by Psychiatrist.       No current [facility]-ordered facility-administered medications on file.       Allergies:  Tree nuts food allergy    Health Maintenance: Completed    ROS:  Gen: no fevers/chills  Eyes: no changes in vision  ENT: no sore throat  Pulm: no sob, no cough  CV: no chest pain  GI: no nausea/vomiting  : no dysuria  MSk: no myalgias  Skin: Positive for rash  Neuro: no headaches    Objective:       Exam:  /70   Pulse 93   Temp 36.8 °C (98.2 °F) (Temporal)   Resp 16   Ht 1.778 m (5' 10\")   Wt 79.8 kg (176 lb)   SpO2 98%   BMI 25.25 kg/m²  " Body mass index is 25.25 kg/m².    Constitutional: Alert, no distress, well-groomed.  Skin: Warm, dry, good turgor, acne scattered on arms, face, and back   Eye: Equal, round and reactive, conjunctiva clear, lids normal.  ENMT: Lips without lesions, good dentition, moist mucous membranes.  Neck: Trachea midline, no masses, no thyromegaly.  Respiratory: Unlabored respiratory effort, no cough.  MSK: Normal gait, moves all extremities.  Neuro: Grossly non-focal.   Psych: Patient is nonverbal    Physical Exam  Skin:     Findings: Erythema present.             Comments: Excoriation marks present         Assessment & Plan:     28 y.o. male with the following -     1. Tinea corporis  This is a new diagnosis.  Reported history and exam findings consistent with severe tinea corporis.  Caregiver that was present with the patient today was not sure on specific details regarding the rash.  Prescription for ketoconazole cream sent in as well as nystatin powder to use after showering.  Strongly recommended keeping the area clean and dry as much as possible.  Written instructions were provided to the care facility the patient is staying at (scanned into chart).  No signs of bacterial infection on exam however there are significant excoriation marks in the groin and penis area so strongly recommended monitoring for signs of infection such as erythema, drainage, fever.  We will have him follow-up with PCP in 2 weeks for reevaluation or sooner if needed as I can concerned he might not have a reliable person monitoring the rash and the patient is nonverbal so he cannot communicate what he is feeling.  - ketoconazole (NIZORAL) 2 % Cream; Apply thin layer to affected area twice daily as needed for rash  Dispense: 60 g; Refill: 2  - nystatin (MYCOSTATIN) powder; Apply 1 g topically 3 times a day as needed (rash (apply after showering)).  Dispense: 60 g; Refill: 2    2. Acne vulgaris  Patient does have scattered areas of acne throughout  his body, however they were mostly concerned he was having allergic reaction.  Discussed that the acne is not consistent with an allergic reaction and the patient likely has dry skin.  Recommended switching scented lotion to unscented cream such as Cetaphil and moisturizing twice a day.  Again, written instructions were provided to the caregiver today.    I spent a total of 30 minutes with record review (including external notes and labs), exam, communication with the patient, communication with other providers, and documentation of this encounter.     Return in about 2 weeks (around 7/17/2023), or if symptoms worsen or fail to improve.    Please note that this dictation was created using voice recognition software. I have made every reasonable attempt to correct obvious errors, but I expect that there are errors of grammar and possibly content that I did not discover before finalizing the note.    Electronically signed by Rosa Maria Vera PA-C on July 3, 2023

## 2023-07-14 ENCOUNTER — OFFICE VISIT (OUTPATIENT)
Dept: MEDICAL GROUP | Facility: PHYSICIAN GROUP | Age: 28
End: 2023-07-14
Payer: MEDICARE

## 2023-07-14 VITALS
OXYGEN SATURATION: 97 % | DIASTOLIC BLOOD PRESSURE: 72 MMHG | SYSTOLIC BLOOD PRESSURE: 110 MMHG | BODY MASS INDEX: 25.22 KG/M2 | HEIGHT: 70 IN | TEMPERATURE: 98.2 F | WEIGHT: 176.2 LBS | HEART RATE: 94 BPM | RESPIRATION RATE: 18 BRPM

## 2023-07-14 DIAGNOSIS — B35.6 TINEA CRURIS: ICD-10-CM

## 2023-07-14 DIAGNOSIS — L70.0 ACNE VULGARIS: ICD-10-CM

## 2023-07-14 PROCEDURE — 3074F SYST BP LT 130 MM HG: CPT | Performed by: FAMILY MEDICINE

## 2023-07-14 PROCEDURE — 99213 OFFICE O/P EST LOW 20 MIN: CPT | Performed by: FAMILY MEDICINE

## 2023-07-14 PROCEDURE — 3078F DIAST BP <80 MM HG: CPT | Performed by: FAMILY MEDICINE

## 2023-07-14 ASSESSMENT — FIBROSIS 4 INDEX: FIB4 SCORE: 0.43

## 2023-07-14 NOTE — PROGRESS NOTES
"Subjective:     CC:   Chief Complaint   Patient presents with    Follow-Up       HPI:   Cruzito presents today with his caregiver.  His caregiver feels that the area in his groin area has greatly improved the redness is going away.    Past Medical History:   Diagnosis Date    Autism     non verbal since age 2 1/2 years     History of seizure        Social History     Tobacco Use    Smoking status: Never    Smokeless tobacco: Never   Vaping Use    Vaping Use: Never used   Substance Use Topics    Alcohol use: No    Drug use: No       Current Outpatient Medications Ordered in Epic   Medication Sig Dispense Refill    ketoconazole (NIZORAL) 2 % Cream Apply thin layer to affected area twice daily as needed for rash 60 g 2    nystatin (MYCOSTATIN) powder Apply 1 g topically 3 times a day as needed (rash (apply after showering)). 60 g 2    levETIRAcetam (KEPPRA) 500 MG Tab Take 1 Tablet by mouth 2 times a day. 180 Tablet 2    tretinoin (RETIN-A) 0.05 % cream Apply small amount to acne on face at night. 20 g 1    risperiDONE (RISPERDAL) 1 MG Tab Take  by mouth. 1 mg AM,  3 mg PM - by Psychiatrist.       No current [facility]-ordered facility-administered medications on file.       Allergies:  Tree nuts food allergy    Health Maintenance: Completed    ROS:  Gen: no fevers/chills, no changes in weight  Heme/Lymph: no easy bruising    Objective:     Exam:  /72 (BP Location: Left arm, Patient Position: Sitting, BP Cuff Size: Adult)   Pulse 94   Temp 36.8 °C (98.2 °F) (Temporal)   Resp 18   Ht 1.778 m (5' 10\")   Wt 79.9 kg (176 lb 3.2 oz)   SpO2 97%   BMI 25.28 kg/m²  Body mass index is 25.28 kg/m².    Gen: Alert and oriented, No apparent distress.  Skin: Warm and dry.  On examination of groin area patient's redness is almost gone.  Patient does have acne lesions and follicular lesions noted.  No signs of active infection.  Caregiver also stating that sometimes the skin is dry.  Eyes: Sclera wnl Pupils normal in size  Psych: " Mood is wnl       Assessment & Plan:     28 y.o. male with the following -     1. Acne vulgaris  I would recommend dermatology for consultation on this.  I did recommend continuing the Dove soap also consideration of Lubriderm lotion if areas of the skin is dry but also would like dermatology to see him to see what else they can recommend for him.  - Referral to Dermatology    2. Tinea cruris  Area has greatly increased improvement looks like it is resolving.    Return if symptoms worsen or fail to improve.    Please note that this dictation was created using voice recognition software. I have made every reasonable attempt to correct obvious errors, but I expect that there are errors of grammar and possibly content that I did not discover before finalizing the note.

## 2023-12-26 ENCOUNTER — OFFICE VISIT (OUTPATIENT)
Dept: MEDICAL GROUP | Facility: PHYSICIAN GROUP | Age: 28
End: 2023-12-26
Payer: MEDICARE

## 2023-12-26 VITALS
DIASTOLIC BLOOD PRESSURE: 72 MMHG | TEMPERATURE: 97.8 F | SYSTOLIC BLOOD PRESSURE: 120 MMHG | HEART RATE: 86 BPM | BODY MASS INDEX: 24.62 KG/M2 | WEIGHT: 172 LBS | HEIGHT: 70 IN | OXYGEN SATURATION: 97 %

## 2023-12-26 DIAGNOSIS — R21 RASH AND NONSPECIFIC SKIN ERUPTION: ICD-10-CM

## 2023-12-26 DIAGNOSIS — L70.0 ACNE VULGARIS: ICD-10-CM

## 2023-12-26 PROCEDURE — 3074F SYST BP LT 130 MM HG: CPT | Performed by: FAMILY MEDICINE

## 2023-12-26 PROCEDURE — 3078F DIAST BP <80 MM HG: CPT | Performed by: FAMILY MEDICINE

## 2023-12-26 PROCEDURE — 99213 OFFICE O/P EST LOW 20 MIN: CPT | Performed by: FAMILY MEDICINE

## 2023-12-26 ASSESSMENT — FIBROSIS 4 INDEX: FIB4 SCORE: 0.43

## 2023-12-26 NOTE — PROGRESS NOTES
"Subjective:     CC:   Chief Complaint   Patient presents with    Follow-Up     No concerns       HPI:   Cruzito presents today with caregiver.  Caregiver states that the Retin-A is not working on his face and she wants me to cancel the prescription.  Patient also is having a itchy rash on his upper arms when asked caregiver how long it has been there she said for years he has been itching it.    Past Medical History:   Diagnosis Date    Autism     non verbal since age 2 1/2 years     History of seizure        Social History     Tobacco Use    Smoking status: Never    Smokeless tobacco: Never   Vaping Use    Vaping Use: Never used   Substance Use Topics    Alcohol use: No    Drug use: No       Current Outpatient Medications Ordered in Epic   Medication Sig Dispense Refill    Multiple Vitamins-Minerals (CENTRUM MULTI + OMEGA 3) Chew Tab Chew.      ketoconazole (NIZORAL) 2 % Cream Apply thin layer to affected area twice daily as needed for rash 60 g 2    levETIRAcetam (KEPPRA) 500 MG Tab Take 1 Tablet by mouth 2 times a day. 180 Tablet 2    tretinoin (RETIN-A) 0.05 % cream Apply small amount to acne on face at night. 20 g 1    risperiDONE (RISPERDAL) 1 MG Tab Take  by mouth. 1 mg AM,  3 mg PM - by Psychiatrist.      nystatin (MYCOSTATIN) powder Apply 1 g topically 3 times a day as needed (rash (apply after showering)). (Patient not taking: Reported on 12/26/2023) 60 g 2     No current Epic-ordered facility-administered medications on file.       Allergies:  Tree nuts food allergy    Health Maintenance: Completed    ROS:  Gen: no fevers/chills, no changes in weight  Heme/Lymph: no easy bruising    Objective:     Exam:  /72 (BP Location: Left arm, Patient Position: Sitting)   Pulse 86   Temp 36.6 °C (97.8 °F) (Temporal)   Ht 1.778 m (5' 10\")   Wt 78 kg (172 lb)   SpO2 97%   BMI 24.68 kg/m²  Body mass index is 24.68 kg/m².    Gen: Alert and oriented, No apparent distress.  Skin: Warm and dry.  Patient has some " old signs of a rash I do not not know if this is may be from him picking or itching that area.  But no signs of infection involves his upper 2 arms patient does have acne noted on his face at this time it looks like his acne is doing well there is no signs  of redness noted.  Eyes: Sclera wnl Pupils normal in size  Lungs: Normal effort, CTA bilaterally, no wheezes, rhonchi, or rales  CV: Regular rate and rhythm. No murmurs, rubs, or gallops.  Musculoskeletal: Normal gait. No extremity cyanosis, clubbing, or edema.  Psych: Mood is wnl       Assessment & Plan:     28 y.o. male with the following -     1. Acne vulgaris  Referral was written again to dermatology informed caregiver of this.  On the form that caregiver brought I wrote down to stop the Retin-A  - Referral to Dermatology    2. Rash and nonspecific skin eruption  Referral was written to dermatology inform caregiver of this.  - Referral to Dermatology    Other orders  - Multiple Vitamins-Minerals (CENTRUM MULTI + OMEGA 3) Chew Tab; Chew.       Return in about 6 months (around 6/26/2024), or if symptoms worsen or fail to improve.    Please note that this dictation was created using voice recognition software. I have made every reasonable attempt to correct obvious errors, but I expect that there are errors of grammar and possibly content that I did not discover before finalizing the note.

## 2024-03-14 ENCOUNTER — OFFICE VISIT (OUTPATIENT)
Dept: MEDICAL GROUP | Facility: PHYSICIAN GROUP | Age: 29
End: 2024-03-14
Payer: MEDICARE

## 2024-03-14 ENCOUNTER — PATIENT OUTREACH (OUTPATIENT)
Dept: HEALTH INFORMATION MANAGEMENT | Facility: OTHER | Age: 29
End: 2024-03-14

## 2024-03-14 VITALS
HEIGHT: 70 IN | TEMPERATURE: 97 F | RESPIRATION RATE: 18 BRPM | WEIGHT: 167.8 LBS | SYSTOLIC BLOOD PRESSURE: 116 MMHG | DIASTOLIC BLOOD PRESSURE: 68 MMHG | HEART RATE: 99 BPM | OXYGEN SATURATION: 100 % | BODY MASS INDEX: 24.02 KG/M2

## 2024-03-14 DIAGNOSIS — F80.9 COMMUNICATION PROBLEM: ICD-10-CM

## 2024-03-14 DIAGNOSIS — G40.209 PARTIAL SYMPTOMATIC EPILEPSY WITH COMPLEX PARTIAL SEIZURES, NOT INTRACTABLE, WITHOUT STATUS EPILEPTICUS (HCC): ICD-10-CM

## 2024-03-14 DIAGNOSIS — E78.5 DYSLIPIDEMIA: ICD-10-CM

## 2024-03-14 DIAGNOSIS — R46.4 SLOWNESS AND POOR RESPONSIVENESS: ICD-10-CM

## 2024-03-14 DIAGNOSIS — F84.0 AUTISM DISORDER: ICD-10-CM

## 2024-03-14 PROCEDURE — 3078F DIAST BP <80 MM HG: CPT | Performed by: FAMILY MEDICINE

## 2024-03-14 PROCEDURE — 3074F SYST BP LT 130 MM HG: CPT | Performed by: FAMILY MEDICINE

## 2024-03-14 PROCEDURE — 99213 OFFICE O/P EST LOW 20 MIN: CPT | Performed by: FAMILY MEDICINE

## 2024-03-14 RX ORDER — TRAZODONE HYDROCHLORIDE 50 MG/1
TABLET ORAL
COMMUNITY
Start: 2024-02-26

## 2024-03-14 ASSESSMENT — FIBROSIS 4 INDEX: FIB4 SCORE: 0.43

## 2024-03-14 NOTE — PROGRESS NOTES
Subjective:     CC:   Chief Complaint   Patient presents with    Follow-Up       HPI:   Cruzito presents today with caregiver with a concern that patient is able to communicate better than he has been recently.  Patient appears to be frustrated at times when he is trying to get attention from someone or trying to convey what he wants.  Patient is going to be seeing his neurologist coming up sometime this month I recommend a discussion with neurology also.  Caregiver would like to see if possibly a speech therapist can come into his home to see if there is a way of improving his communication with others.  Caregiver denies issues with eating.    Past Medical History:   Diagnosis Date    Autism     non verbal since age 2 1/2 years     History of seizure        Social History     Tobacco Use    Smoking status: Never    Smokeless tobacco: Never   Vaping Use    Vaping Use: Never used   Substance Use Topics    Alcohol use: No    Drug use: No       Current Outpatient Medications Ordered in Epic   Medication Sig Dispense Refill    traZODone (DESYREL) 50 MG Tab       levETIRAcetam (KEPPRA) 500 MG Tab TAKE ONE TABLET BY MOUTH TWICE A  Tablet 2    Multiple Vitamins-Minerals (CENTRUM MULTI + OMEGA 3) Chew Tab Chew.      ketoconazole (NIZORAL) 2 % Cream Apply thin layer to affected area twice daily as needed for rash 60 g 2    tretinoin (RETIN-A) 0.05 % cream Apply small amount to acne on face at night. 20 g 1    risperiDONE (RISPERDAL) 1 MG Tab Take  by mouth. 1 mg AM,  3 mg PM - by Psychiatrist.       No current [facility]-ordered facility-administered medications on file.       Allergies:  Tree nuts food allergy    Health Maintenance: Completed    ROS:  Gen: no fevers/chills  Eyes: no changes in vision  ENT: no sore throat, no hearing loss, no bloody nose  Pulm: no sob, no cough  CV: no chest pain, no palpitations  GI: no nausea/vomiting, no diarrhea  : no dysuria  Neuro: no headaches, no numbness/tingling  Heme/Lymph: no  "easy bruising    Objective:     Exam:  /68 (BP Location: Left arm, Patient Position: Sitting, BP Cuff Size: Adult)   Pulse 99   Temp 36.1 °C (97 °F) (Temporal)   Resp 18   Ht 1.778 m (5' 10\")   Wt 76.1 kg (167 lb 12.8 oz)   SpO2 100%   BMI 24.08 kg/m²  Body mass index is 24.08 kg/m².    Gen: Alert and oriented, No apparent distress.  Skin: Warm and dry.  No obvious lesions.  Eyes: Sclera wnl Pupils normal in size  Lungs: Normal effort, CTA bilaterally, no wheezes, rhonchi, or rales  CV: Regular rate and rhythm. No murmurs, rubs, or gallops.  Musculoskeletal: Normal gait. No extremity cyanosis, clubbing, or edema.  Neuro: Oriented to person, place and time  Psych: Mood is wnl       Assessment & Plan:     28 y.o. male with the following -     1. Communication problem  Referral was written for speech therapy  - Referral to Speech Therapy    2. Partial symptomatic epilepsy with complex partial seizures, not intractable, without status epilepticus (HCC)  Patient to follow-up with neurology as planned    Return in about 3 months (around 6/14/2024), or if symptoms worsen or fail to improve.    Please note that this dictation was created using voice recognition software. I have made every reasonable attempt to correct obvious errors, but I expect that there are errors of grammar and possibly content that I did not discover before finalizing the note.  "

## 2024-03-14 NOTE — PROGRESS NOTES
Provider came to talk about patient - needs a speech therapist to come to the home. Informed PCP that needs referral. Referral in the chart left message for [facility] awaiting call back.

## 2024-03-25 ENCOUNTER — HOME HEALTH ADMISSION (OUTPATIENT)
Dept: HOME HEALTH SERVICES | Facility: HOME HEALTHCARE | Age: 29
End: 2024-03-25
Payer: MEDICARE

## 2024-03-25 DIAGNOSIS — F84.0 AUTISM DISORDER: ICD-10-CM

## 2024-03-25 DIAGNOSIS — F80.9 COMMUNICATION PROBLEM: ICD-10-CM

## 2024-03-28 ENCOUNTER — HOME CARE VISIT (OUTPATIENT)
Dept: HOME HEALTH SERVICES | Facility: HOME HEALTHCARE | Age: 29
End: 2024-03-28
Payer: MEDICARE

## 2024-03-28 VITALS
SYSTOLIC BLOOD PRESSURE: 122 MMHG | OXYGEN SATURATION: 96 % | HEART RATE: 89 BPM | TEMPERATURE: 98.8 F | RESPIRATION RATE: 16 BRPM | DIASTOLIC BLOOD PRESSURE: 82 MMHG

## 2024-03-28 PROCEDURE — 665005 NO-PAY RAP - HOME HEALTH

## 2024-03-28 PROCEDURE — G0493 RN CARE EA 15 MIN HH/HOSPICE: HCPCS

## 2024-03-28 ASSESSMENT — ACTIVITIES OF DAILY LIVING (ADL)
BATHING ASSESSED: 1
PHYSICAL TRANSFERS ASSESSED: 1
AMBULATION ASSISTANCE: 1
USING THE TELPHONE: DEPENDENT
DRESSING_LB_CURRENT_FUNCTION: SUPERVISION
GROOMING ASSESSED: 1
TOILETING: 1
TELEPHONE EQUIPMENT USED: NONVERBAL
FEEDING: SUPERVISION
CURRENT_FUNCTION: INDEPENDENT
LAUNDRY: DEPENDENT
LIGHT HOUSEKEEPING: DEPENDENT
TELEPHONE USE ASSESSED: 1
DRESSING_UB_CURRENT_FUNCTION: SUPERVISION
PREPARING MEALS: DEPENDENT
ORAL_CARE_ASSESSED: 1
HOUSEKEEPING ASSESSED: 1
SHOPPING: DEPENDENT
TOILETING: SUPERVISION
TRANSPORTATION ASSESSED: 1
ORAL_CARE_CURRENT_FUNCTION: INDEPENDENT
TRANSPORTATION COMMENTS: LIVES IN GROUP HOME
BATHING_CURRENT_FUNCTION: SUPERVISION
GROOMING_CURRENT_FUNCTION: SUPERVISION
SHOPPING ASSESSED: 1
TRANSPORTATION: DEPENDENT
FEEDING ASSESSED: 1
AMBULATION ASSISTANCE: SUPERVISION
LAUNDRY ASSESSED: 1

## 2024-03-28 ASSESSMENT — ENCOUNTER SYMPTOMS
LAST BOWEL MOVEMENT: 66927
AGITATION: 1
SEIZURES: 1
VOMITING: DENIES
DIFFICULTY THINKING: 1
POOR JUDGMENT: 1
STOOL FREQUENCY: DAILY
DENIES PAIN: 1
PERSON REPORTING PAIN: PATIENT
NAUSEA: DENIES

## 2024-03-28 NOTE — CASE COMMUNICATION
Primary dx/Skilled need: 28 yo male referred to HH after caregiver requests SLP.  Caregiver reported recent decline in patient's communication.  PMH: Autistic disorder, seizure disorder, epilepsy, sleep disorder, slow/poor responsiveness, non-verbal, and HLD.  Mentation: Alert responds to verbal commands.  Pt uses sounds and hand gestures to communicate.  Unable to determine orientation.  SN frequency: 1w4  Zip code: [address]  Disciplines  ordered: SN, SLP, OT  Insurance and authorization: Medicare  Certification period: 3/28/24 - 5/26/24  Special considerations: Lives in group home for mental disability.

## 2024-04-03 ENCOUNTER — HOME CARE VISIT (OUTPATIENT)
Dept: HOME HEALTH SERVICES | Facility: HOME HEALTHCARE | Age: 29
End: 2024-04-03
Payer: MEDICARE

## 2024-04-03 VITALS
OXYGEN SATURATION: 98 % | SYSTOLIC BLOOD PRESSURE: 104 MMHG | DIASTOLIC BLOOD PRESSURE: 68 MMHG | RESPIRATION RATE: 16 BRPM | HEART RATE: 91 BPM | TEMPERATURE: 98.4 F

## 2024-04-03 PROCEDURE — G0152 HHCP-SERV OF OT,EA 15 MIN: HCPCS

## 2024-04-03 SDOH — ECONOMIC STABILITY: HOUSING INSECURITY: HOME SAFETY: PT LIVES IN A GROUP HOME WITH 24 HOUR SUPERVISION.

## 2024-04-03 ASSESSMENT — ACTIVITIES OF DAILY LIVING (ADL)
LAUNDRY: DEPENDENT
FEEDING: INDEPENDENT
SHOPPING ASSESSED: 1
LAUNDRY ASSESSED: 1
BATHING_CURRENT_FUNCTION: SUPERVISION
TRANSPORTATION ASSESSED: 1
ORAL_CARE_EQUIPMENT_USED: SUPERVISION
AMBULATION ASSISTANCE: INDEPENDENT
GROOMING ASSESSED: 1
PREPARING MEALS: DEPENDENT
USING THE TELPHONE: DEPENDENT
ORAL_CARE_CURRENT_FUNCTION: NEEDS ASSISTANCE
TOILETING: INDEPENDENT
GROOMING_CURRENT_FUNCTION: INDEPENDENT
PHYSICAL TRANSFERS ASSESSED: 1
BATHING ASSESSED: 1
DRESSING_UB_CURRENT_FUNCTION: INDEPENDENT
FEEDING ASSESSED: 1
AMBULATION ASSISTANCE: 1
HOUSEKEEPING ASSESSED: 1
LIGHT HOUSEKEEPING: DEPENDENT
CURRENT_FUNCTION: INDEPENDENT
TRANSPORTATION: DEPENDENT
TOILETING: 1
DRESSING_LB_CURRENT_FUNCTION: MINIMUM ASSIST
ORAL_CARE_ASSESSED: 1
SHOPPING: DEPENDENT
TELEPHONE USE ASSESSED: 1

## 2024-04-03 ASSESSMENT — PAIN SCALES - PAIN ASSESSMENT IN ADVANCED DEMENTIA (PAINAD)
CONSOLABILITY: 0 - NO NEED TO CONSOLE.
NEGVOCALIZATION: 0 - NONE.
TOTALSCORE: 0
FACIALEXPRESSION: 0 - SMILING OR INEXPRESSIVE.
BODYLANGUAGE: 0 - RELAXED.

## 2024-04-03 ASSESSMENT — ENCOUNTER SYMPTOMS
DIFFICULTY THINKING: 1
PERSON REPORTING PAIN: PATIENT
DENIES PAIN: 1

## 2024-04-04 ENCOUNTER — HOME CARE VISIT (OUTPATIENT)
Dept: HOME HEALTH SERVICES | Facility: HOME HEALTHCARE | Age: 29
End: 2024-04-04
Payer: MEDICARE

## 2024-04-04 VITALS
OXYGEN SATURATION: 99 % | RESPIRATION RATE: 16 BRPM | DIASTOLIC BLOOD PRESSURE: 72 MMHG | SYSTOLIC BLOOD PRESSURE: 120 MMHG | TEMPERATURE: 97.8 F | HEART RATE: 84 BPM

## 2024-04-04 VITALS
TEMPERATURE: 99 F | SYSTOLIC BLOOD PRESSURE: 120 MMHG | HEART RATE: 92 BPM | RESPIRATION RATE: 16 BRPM | DIASTOLIC BLOOD PRESSURE: 72 MMHG | OXYGEN SATURATION: 99 %

## 2024-04-04 PROCEDURE — G0153 HHCP-SVS OF S/L PATH,EA 15MN: HCPCS

## 2024-04-04 PROCEDURE — G0495 RN CARE TRAIN/EDU IN HH: HCPCS

## 2024-04-04 ASSESSMENT — ENCOUNTER SYMPTOMS
DESCRIPTION OF MEMORY LOSS: IMMEDIATE
POOR JUDGMENT: 1
NAUSEA: DENIED
LAST BOWEL MOVEMENT: 66934
DESCRIPTION OF MEMORY LOSS: SHORT TERM
DIFFICULTY THINKING: 1
VOMITING: DENIED
DIFFICULTY THINKING: 1
POOR JUDGMENT: 1
DESCRIPTION OF MEMORY LOSS: LONG TERM

## 2024-04-04 ASSESSMENT — PAIN SCALES - PAIN ASSESSMENT IN ADVANCED DEMENTIA (PAINAD)
FACIALEXPRESSION: 0 - SMILING OR INEXPRESSIVE.
CONSOLABILITY: 0 - NO NEED TO CONSOLE.
BODYLANGUAGE: 0 - RELAXED.
NEGVOCALIZATION: 0 - NONE.
TOTALSCORE: 0

## 2024-04-04 ASSESSMENT — ACTIVITIES OF DAILY LIVING (ADL)
OASIS_M1830: 03
TRANSPORTATION COMMENTS: YES

## 2024-04-04 NOTE — CASE COMMUNICATION
Quality Review Completed for SOC OASIS by BOUBACAR Brasher RN on 4/4/2024:     Edits completed by BOUBACAR Brasher RN:     1.  and  diagnosis coding updated per chart review  2.  is 3/27 per intake valid referral date  3.  has MCR A & B, changed to #1  4. Per narrative supervision level of assist for functional status, changed , 1810,1820,1845, to 2.  to 1,  to 3  5. Checked seizure precautions to 485 Safety Measu res

## 2024-04-06 NOTE — CASE COMMUNICATION
Reviewed and approved of edits.  ----- Message -----  From: Deloris Brasher R.N.  Sent: 4/4/2024  12:27 PM PDT  To: Kallie Bone R.N.      Quality Review Completed for SOC OASIS by BOUBACAR Brasher, RN on 4/4/2024:     Edits completed by BOUBACAR Brasher RN:     1.  and  diagnosis coding updated per chart review  2.  is 3/27 per intake valid referral date  3.  has MCR A & B, changed to #1  4. Per narrative supervision leve l of assist for functional status, changed , 1810,1820,1845, to 2.  to 1,  to 3  5. Checked seizure precautions to 485 Safety Measures

## 2024-04-08 ENCOUNTER — HOME CARE VISIT (OUTPATIENT)
Dept: HOME HEALTH SERVICES | Facility: HOME HEALTHCARE | Age: 29
End: 2024-04-08
Payer: MEDICARE

## 2024-04-10 ENCOUNTER — HOME CARE VISIT (OUTPATIENT)
Dept: HOME HEALTH SERVICES | Facility: HOME HEALTHCARE | Age: 29
End: 2024-04-10
Payer: MEDICARE

## 2024-04-11 ENCOUNTER — HOME CARE VISIT (OUTPATIENT)
Dept: HOME HEALTH SERVICES | Facility: HOME HEALTHCARE | Age: 29
End: 2024-04-11
Payer: MEDICARE

## 2024-04-18 ENCOUNTER — HOME CARE VISIT (OUTPATIENT)
Dept: HOME HEALTH SERVICES | Facility: HOME HEALTHCARE | Age: 29
End: 2024-04-18
Payer: MEDICARE

## 2024-04-25 ENCOUNTER — HOME CARE VISIT (OUTPATIENT)
Dept: HOME HEALTH SERVICES | Facility: HOME HEALTHCARE | Age: 29
End: 2024-04-25
Payer: MEDICARE

## 2024-04-25 ASSESSMENT — ACTIVITIES OF DAILY LIVING (ADL)
HOME_HEALTH_OASIS: 01
OASIS_M1830: 03

## 2024-04-26 ENCOUNTER — DOCUMENTATION (OUTPATIENT)
Dept: HEALTH INFORMATION MANAGEMENT | Facility: OTHER | Age: 29
End: 2024-04-26
Payer: MEDICARE

## 2024-04-26 ENCOUNTER — HOME CARE VISIT (OUTPATIENT)
Dept: HOME HEALTH SERVICES | Facility: HOME HEALTHCARE | Age: 29
End: 2024-04-26
Payer: MEDICARE

## 2024-04-26 NOTE — Clinical Note
I AGREE WITH THESE CHANGES. FRANK LANGLEY  ----- Message -----  From: Bebe Alan R.N.  Sent: 4/26/2024  11:16 AM PDT  To: Ijeoma Keyes R.N.      Quality Review for [place] [facility]IS by VELIA Alan RN on  April 26, 2024     Edits completed by VELIA Alan RN:  1. Changed  C to NA per the plan of care  2. Changed flu vaccine received to did not receive due to other reasons  3. Changed  to 1, per new guidance option #2 should only be chosen if the pt will be on service with other HHA.   4.  is yes, med list available by () EHR  5. Changed  to 4 per chart review

## 2024-04-26 NOTE — CASE COMMUNICATION
"Additional edit to SOC documentation per chart review:    Changed  to 4 on SOC due to documentation of \" Caregiver reported recent decline in patient's communication.  PMH: Autistic disorder, seizure disorder, epilepsy, sleep disorder, slow/poor responsiveness, non-verbal, and HLD.   Mentation: Alert responds to verbal commands.  Pt uses sounds and hand gestures to communicate.  Unable to determine orientation.\"  "

## 2024-04-26 NOTE — CASE COMMUNICATION
Quality Review for [place] [facility]IS by VELIA Alan, RN on  April 26, 2024     Edits completed by VELIA Alan, RN:  1. Changed  C to NA per the plan of care  2. Changed flu vaccine received to did not receive due to other reasons  3. Changed  to 1, per new guidance option #2 should only be chosen if the pt will be on service with other HHA.   4.  is yes, med list available by () EHR  5. Changed  to 4 per chart revi ew

## 2024-04-29 NOTE — CASE COMMUNICATION
"Reviewed and approved of edits.  ----- Message -----  From: Bebe Alan R.N.  Sent: 4/26/2024   8:30 AM PDT  To: Kallie Bone R.N.      Additional edit to SOC documentation per chart review:    Changed  to 4 on SOC due to documentation of \" Caregiver reported recent decline in patient's communication.  PMH: Autistic disorder, seizure disorder, epilepsy, sleep disorder, slow/poor responsiveness, non-verbal, and HLD.    Mentation: Alert responds to verbal commands.  Pt uses sounds and hand gestures to communicate.  Unable to determine orientation.\"  " Injectafer infusion complete. Pt tolerated well. VSS. NAD. IV remains intact for next procedure with IR. Pt verbalized understanding of discharge instructions before leaving with granddaughter to [facility] floor for checkIn.

## 2024-06-03 ENCOUNTER — OFFICE VISIT (OUTPATIENT)
Dept: MEDICAL GROUP | Facility: PHYSICIAN GROUP | Age: 29
End: 2024-06-03
Payer: MEDICARE

## 2024-06-03 VITALS
OXYGEN SATURATION: 96 % | HEART RATE: 91 BPM | RESPIRATION RATE: 18 BRPM | TEMPERATURE: 96.7 F | SYSTOLIC BLOOD PRESSURE: 106 MMHG | HEIGHT: 70 IN | DIASTOLIC BLOOD PRESSURE: 64 MMHG | BODY MASS INDEX: 23.94 KG/M2 | WEIGHT: 167.2 LBS

## 2024-06-03 DIAGNOSIS — Z11.59 NEED FOR HEPATITIS C SCREENING TEST: ICD-10-CM

## 2024-06-03 DIAGNOSIS — F80.9 COMMUNICATION PROBLEM: ICD-10-CM

## 2024-06-03 DIAGNOSIS — Z00.00 WELLNESS EXAMINATION: ICD-10-CM

## 2024-06-03 DIAGNOSIS — F84.0 AUTISM DISORDER: ICD-10-CM

## 2024-06-03 DIAGNOSIS — E55.9 VITAMIN D DEFICIENCY: ICD-10-CM

## 2024-06-03 PROCEDURE — 3078F DIAST BP <80 MM HG: CPT | Performed by: FAMILY MEDICINE

## 2024-06-03 PROCEDURE — 99214 OFFICE O/P EST MOD 30 MIN: CPT | Performed by: FAMILY MEDICINE

## 2024-06-03 PROCEDURE — 3074F SYST BP LT 130 MM HG: CPT | Performed by: FAMILY MEDICINE

## 2024-06-03 RX ORDER — ARIPIPRAZOLE 5 MG/1
TABLET ORAL
COMMUNITY
Start: 2024-05-03

## 2024-06-03 RX ORDER — ALPRAZOLAM 0.25 MG/1
TABLET ORAL
COMMUNITY
Start: 2024-05-16

## 2024-06-03 RX ORDER — ARIPIPRAZOLE 2 MG/1
TABLET ORAL
COMMUNITY
Start: 2024-05-16

## 2024-06-03 ASSESSMENT — FIBROSIS 4 INDEX: FIB4 SCORE: 0.45

## 2024-06-03 NOTE — PROGRESS NOTES
Subjective:     CC:   Chief Complaint   Patient presents with    Follow-Up       HPI:   Cruzito presents today with his caregiver.  This caregiver usually does not come with him.  Last time we sent someone to help him with communication skills.  Caregiver informs me that Cruzito was not interested in doing that.  Cruzito does have a way of communicating when he needs to go the restroom.  Caregiver feels at this time Cruzito seems to be doing well and is not as frustrated with his inability to communicate at times.  Patient is on Abilify would recommend a follow-up appointment and I assume that is with psychiatry.  Cruzito has also seen neurology but apparently some of his medications for the neurologist have been stopped.  Caregiver denies Cruzito having any seizure issues.    Past Medical History:   Diagnosis Date    Autism     non verbal since age 2 1/2 years     History of seizure        Social History     Tobacco Use    Smoking status: Never    Smokeless tobacco: Never   Vaping Use    Vaping status: Never Used   Substance Use Topics    Alcohol use: No    Drug use: No       Current Outpatient Medications Ordered in Epic   Medication Sig Dispense Refill    ALPRAZolam (XANAX) 0.25 MG Tab       ARIPiprazole (ABILIFY) 2 MG tablet       ARIPiprazole (ABILIFY) 5 MG tablet       Nystatin Powder Apply 1 Application topically 3 times a day as needed (Rash). Indications: Rash      ketoconazole (NIZORAL) 2 % Cream Apply thin layer to affected area twice daily as needed for rash 60 g 2     No current Epic-ordered facility-administered medications on file.       Allergies:  Tree nuts food allergy    Health Maintenance: Completed    ROS:  Gen: no fevers/chills, no changes in weight  Pulm: no sob, no cough  CV: no chest pain, no palpitations  GI: no nausea/vomiting, no diarrhea  Heme/Lymph: no easy bruising    Objective:     Exam:  /64 (BP Location: Left arm, Patient Position: Sitting, BP Cuff Size: Adult)   Pulse 91   Temp 35.9 °C  "(96.7 °F) (Temporal)   Resp 18   Ht 1.778 m (5' 10\")   Wt 75.8 kg (167 lb 3.2 oz)   SpO2 96%   BMI 23.99 kg/m²  Body mass index is 23.99 kg/m².    Gen: Alert and oriented, No apparent distress.  Skin: Warm and dry.  No obvious lesions.  Eyes: Sclera wnl Pupils normal in size  Lungs: Normal effort, CTA bilaterally, no wheezes, rhonchi, or rales  CV: Regular rate and rhythm. No murmurs, rubs, or gallops.  Musculoskeletal: Normal gait. No extremity cyanosis, clubbing, or edema.  Psych: Mood is wnl       Assessment & Plan:     29 y.o. male with the following -     1. Communication problem  Caregiver at this time it appears to feel that patient is doing well.  I did recommend patient follow-up with neurology and also he is probably seeing a psychiatrist also.    2. Autism disorder  Patient to follow-up with psychiatry    Return in about 3 months (around 9/3/2024), or if symptoms worsen or fail to improve.  I would recommend following him up in about 3 months we will go ahead and get nonfasting lab work prior to his appointment    Please note that this dictation was created using voice recognition software. I have made every reasonable attempt to correct obvious errors, but I expect that there are errors of grammar and possibly content that I did not discover before finalizing the note.  "

## 2024-06-28 ENCOUNTER — OFFICE VISIT (OUTPATIENT)
Dept: MEDICAL GROUP | Facility: PHYSICIAN GROUP | Age: 29
End: 2024-06-28
Payer: MEDICARE

## 2024-06-28 VITALS
WEIGHT: 171.8 LBS | OXYGEN SATURATION: 100 % | HEART RATE: 94 BPM | SYSTOLIC BLOOD PRESSURE: 94 MMHG | RESPIRATION RATE: 11 BRPM | BODY MASS INDEX: 24.6 KG/M2 | HEIGHT: 70 IN | TEMPERATURE: 97 F | DIASTOLIC BLOOD PRESSURE: 60 MMHG

## 2024-06-28 DIAGNOSIS — G47.9 SLEEP DISTURBANCE: ICD-10-CM

## 2024-06-28 DIAGNOSIS — R35.0 URINE FREQUENCY: ICD-10-CM

## 2024-06-28 LAB
APPEARANCE UR: CLEAR
BILIRUB UR STRIP-MCNC: NEGATIVE MG/DL
COLOR UR AUTO: YELLOW
GLUCOSE UR STRIP.AUTO-MCNC: NEGATIVE MG/DL
KETONES UR STRIP.AUTO-MCNC: NEGATIVE MG/DL
LEUKOCYTE ESTERASE UR QL STRIP.AUTO: NEGATIVE
NITRITE UR QL STRIP.AUTO: NEGATIVE
PH UR STRIP.AUTO: 7 [PH] (ref 5–8)
PROT UR QL STRIP: NEGATIVE MG/DL
RBC UR QL AUTO: NEGATIVE
SP GR UR STRIP.AUTO: 1.01
UROBILINOGEN UR STRIP-MCNC: 0.2 MG/DL

## 2024-06-28 RX ORDER — LEVETIRACETAM 500 MG/1
TABLET ORAL
COMMUNITY
Start: 2024-06-03

## 2024-06-28 RX ORDER — TRAZODONE HYDROCHLORIDE 50 MG/1
50 TABLET ORAL
COMMUNITY
Start: 2024-06-03

## 2024-06-28 ASSESSMENT — FIBROSIS 4 INDEX: FIB4 SCORE: 0.45

## 2024-06-28 NOTE — ASSESSMENT & PLAN NOTE
This is a new problem.  He is brought in by one of his caregivers states he has been going to the bathroom urinating more often than normal.  They wanted to make sure nothing was wrong.

## 2024-06-28 NOTE — ASSESSMENT & PLAN NOTE
This is an ongoing problem.  Patient apparently is on trazodone at night to help him sleep.  Caregiver states he has been staying up till 2 in the morning before going to sleep.  On review of his records from the care facility he has medications prescribed by Dr. Kelly.  I encouraged him to contact her to see if the dosage could be increased.

## 2024-06-28 NOTE — PROGRESS NOTES
Subjective:     CC: Here for couple of issues.    HPI:   Cruzito presents today with the following medical concerns:    Urine frequency  This is a new problem.  He is brought in by one of his caregivers states he has been going to the bathroom urinating more often than normal.  They wanted to make sure nothing was wrong.    Sleep disturbance  This is an ongoing problem.  Patient apparently is on trazodone at night to help him sleep.  Caregiver states he has been staying up till 2 in the morning before going to sleep.  On review of his records from the care facility he has medications prescribed by Dr. Kelly.  I encouraged him to contact her to see if the dosage could be increased.    Past Medical History:   Diagnosis Date    Autism     non verbal since age 2 1/2 years     History of seizure        Social History     Tobacco Use    Smoking status: Never    Smokeless tobacco: Never   Vaping Use    Vaping status: Never Used   Substance Use Topics    Alcohol use: No    Drug use: No       Current Outpatient Medications Ordered in Epic   Medication Sig Dispense Refill    levETIRAcetam (KEPPRA) 500 MG Tab       traZODone (DESYREL) 50 MG Tab 50 mg.      ALPRAZolam (XANAX) 0.25 MG Tab       ARIPiprazole (ABILIFY) 2 MG tablet       ARIPiprazole (ABILIFY) 5 MG tablet       Nystatin Powder Apply 1 Application topically 3 times a day as needed (Rash). Indications: Rash      ketoconazole (NIZORAL) 2 % Cream Apply thin layer to affected area twice daily as needed for rash 60 g 2     No current Epic-ordered facility-administered medications on file.       Allergies:  Tree nuts food allergy    Health Maintenance: Completed    ROS:  Gen: no fevers/chills, no changes in weight  Eyes: no changes in vision  ENT: no sore throat, no hearing loss, no bloody nose  Pulm: no sob, no cough  CV: no chest pain, no palpitations  GI: no nausea/vomiting, no diarrhea  : no dysuria  MSk: no myalgias  Skin: no rash  Neuro: no headaches, no  "numbness/tingling  Heme/Lymph: no easy bruising      Objective:       Exam:  BP 94/60   Pulse 94   Temp 36.1 °C (97 °F) (Temporal)   Resp (!) 11   Ht 1.778 m (5' 10\")   Wt 77.9 kg (171 lb 12.8 oz)   SpO2 100%   BMI 24.65 kg/m²  Body mass index is 24.65 kg/m².    Gen:  No apparent distress.  Lungs: Normal effort,   Ext: No clubbing, cyanosis, edema.      Labs: Point-of-care urinalysis is normal    Assessment & Plan:     29 y.o. male with the following -     1. Urine frequency  This is a new issue.  I told caregiver it may be just an intention getting behavior.  There is no evidence of infection or diabetes.  Of note while he is here in the clinic he has not been having the trouble he has at the care facility.  Follow-up as needed.  - POCT Urinalysis    2. Sleep disturbance  This is a chronic problem.  They were told to call his other doctor to see if she could increase the dose of his trazodone.      Return if symptoms worsen or fail to improve.    Please note that this dictation was created using voice recognition software. I have made every reasonable attempt to correct obvious errors, but I expect that there are errors of grammar and possibly content that I did not discover before finalizing the note.        "

## 2024-08-16 ENCOUNTER — OFFICE VISIT (OUTPATIENT)
Dept: URGENT CARE | Facility: CLINIC | Age: 29
End: 2024-08-16
Payer: MEDICARE

## 2024-08-16 VITALS
HEIGHT: 72 IN | OXYGEN SATURATION: 97 % | TEMPERATURE: 97.8 F | WEIGHT: 176.6 LBS | BODY MASS INDEX: 23.92 KG/M2 | RESPIRATION RATE: 16 BRPM | HEART RATE: 76 BPM | SYSTOLIC BLOOD PRESSURE: 112 MMHG | DIASTOLIC BLOOD PRESSURE: 64 MMHG

## 2024-08-16 DIAGNOSIS — B96.89 BACTERIAL SKIN INFECTION: ICD-10-CM

## 2024-08-16 DIAGNOSIS — H92.02 LEFT EAR PAIN: ICD-10-CM

## 2024-08-16 DIAGNOSIS — L08.9 BACTERIAL SKIN INFECTION: ICD-10-CM

## 2024-08-16 PROBLEM — Z00.00 WELLNESS EXAMINATION: Status: RESOLVED | Noted: 2022-07-29 | Resolved: 2024-08-16

## 2024-08-16 PROBLEM — Z02.89 ENCOUNTER FOR COMPLETION OF FORM WITH PATIENT: Status: RESOLVED | Noted: 2021-04-30 | Resolved: 2024-08-16

## 2024-08-16 PROCEDURE — 99213 OFFICE O/P EST LOW 20 MIN: CPT

## 2024-08-16 PROCEDURE — 3078F DIAST BP <80 MM HG: CPT

## 2024-08-16 PROCEDURE — 3074F SYST BP LT 130 MM HG: CPT

## 2024-08-16 RX ORDER — CEPHALEXIN 500 MG/1
500 CAPSULE ORAL 2 TIMES DAILY
Qty: 14 CAPSULE | Refills: 0 | Status: SHIPPED | OUTPATIENT
Start: 2024-08-16 | End: 2024-08-23

## 2024-08-16 ASSESSMENT — FIBROSIS 4 INDEX: FIB4 SCORE: 0.45

## 2024-08-16 NOTE — PROGRESS NOTES
Subjective:   Cruzito Aguilar is a 29 y.o. male who presents for Otalgia (Has been pointing at left ear started today earlier )      HPI  Patient presents with caretaker.  Caretaker is primary historian.  Patient is nonverbal.  According to the caretaker patient has been pointing at left ear since this morning and they are concerned that the patient may have developed an ear infection. He has not had any fever reducing medications today.     Negative: hx of diabetes, hx of immunodeficiency, ear drainage, blood from ear, trauma to head, rash behind ear, postauricular swelling/tenderness/erythema, auricular fluctuance/deformity      Review of Systems   HENT:  Positive for ear pain.    Skin:  Positive for rash (left ear).   All other systems reviewed and are negative.      Medical History:  Past Medical History:   Diagnosis Date    Autism     non verbal since age 2 1/2 years     History of seizure        Allergies:  Allergies   Allergen Reactions    Tree Nuts Food Allergy        Social history, surgical history, medications, and current problem list reviewed today in Epic.       Objective:     /64 (BP Location: Left arm, Patient Position: Sitting)   Pulse 76   Temp 36.6 °C (97.8 °F) (Temporal)   Resp 16   Ht 1.829 m (6')   Wt 80.1 kg (176 lb 9.6 oz)   SpO2 97%     Physical Exam  Vitals reviewed.   Constitutional:       General: He is not in acute distress.     Appearance: Normal appearance. He is not ill-appearing, toxic-appearing or diaphoretic.   HENT:      Head: Normocephalic.      Jaw: There is normal jaw occlusion.      Right Ear: Tympanic membrane, ear canal and external ear normal.      Left Ear: Tympanic membrane and ear canal normal. Swelling and tenderness present. No laceration or drainage. No mastoid tenderness.      Ears:        Comments: Erythema and mild swelling noted to cartilage.  No fluctuance noted.  No crepitus noted.  No open wounds noted     Nose: Nose normal. No congestion or  rhinorrhea.      Right Sinus: No maxillary sinus tenderness or frontal sinus tenderness.      Left Sinus: No maxillary sinus tenderness or frontal sinus tenderness.      Mouth/Throat:      Lips: Pink.      Mouth: Mucous membranes are moist.      Tongue: Tongue does not deviate from midline.      Pharynx: Oropharynx is clear. Uvula midline. No oropharyngeal exudate, posterior oropharyngeal erythema or uvula swelling.   Eyes:      Extraocular Movements: Extraocular movements intact.      Conjunctiva/sclera: Conjunctivae normal.      Pupils: Pupils are equal, round, and reactive to light.   Cardiovascular:      Rate and Rhythm: Normal rate and regular rhythm.      Pulses: Normal pulses.      Heart sounds: Normal heart sounds.   Pulmonary:      Effort: Pulmonary effort is normal.      Breath sounds: Normal breath sounds.   Abdominal:      General: Abdomen is flat.      Palpations: Abdomen is soft.      Tenderness: There is no abdominal tenderness.   Musculoskeletal:         General: Normal range of motion.      Cervical back: Normal range of motion and neck supple.   Lymphadenopathy:      Cervical: No cervical adenopathy.   Skin:     General: Skin is warm.      Capillary Refill: Capillary refill takes less than 2 seconds.   Neurological:      General: No focal deficit present.      Mental Status: He is alert and oriented to person, place, and time.   Psychiatric:         Mood and Affect: Mood normal.         Behavior: Behavior normal.         Assessment/Plan:       Diagnosis and associated orders:     1. Bacterial skin infection  - cephALEXin (KEFLEX) 500 MG Cap; Take 1 Capsule by mouth 2 times a day for 7 days.  Dispense: 14 Capsule; Refill: 0    2. Left ear pain     Comments/MDM:       29-year-old afebrile, hemodynamically stable, generally well-appearing male presenting with caregiver.  Caregiver is concerned that patient has been pointing to left ear today and has noticed some redness.  Perichondritis vs cellulitis  of left ear. No concern for mastoiditis, necrotizing external otitis, elaina hunt syndrome.  Internal ear structures are within normal limits.  With patient being nonverbal and relying on caregivers for healthcare needs I have decided to err on the side of caution and treat the left ear as if it has a bacterial skin infection.  Cephalexin twice daily for 7 days has been sent to the pharmacy.  Caregiver was instructed to monitor for worsening symptoms over the next 72 hours and return to the clinic or the emergency room if symptoms worsen.      Patient is clinically stable at today's acute urgent care visit. Vital signs are normal and reassuring.  No acute distress noted. Appropriate for outpatient management at this time. No red flag warnings noted.  Caregiver given strict instructions to follow up with emergency room if they develop any red flag warnings which were discussed in depth.  They verbalized understanding.      Differential diagnosis, natural history, supportive care, and indications for immediate follow-up discussed. All questions answered.  Caregiver agrees with the plan of care. Advised the patient to follow-up with the primary care physician for recheck, reevaluation, and consideration of further management or the emergency room for worsening symptoms.      Please note that this dictation was created using voice recognition software. I have made every reasonable attempt to correct obvious errors, but I expect that there are errors of grammar and possibly content that I did not discover before finalizing the note.

## 2024-09-13 ENCOUNTER — OFFICE VISIT (OUTPATIENT)
Dept: MEDICAL GROUP | Facility: PHYSICIAN GROUP | Age: 29
End: 2024-09-13
Payer: MEDICARE

## 2024-09-13 VITALS
SYSTOLIC BLOOD PRESSURE: 108 MMHG | WEIGHT: 180.2 LBS | OXYGEN SATURATION: 97 % | DIASTOLIC BLOOD PRESSURE: 66 MMHG | TEMPERATURE: 98.1 F | BODY MASS INDEX: 24.41 KG/M2 | HEIGHT: 72 IN | HEART RATE: 86 BPM | RESPIRATION RATE: 18 BRPM

## 2024-09-13 DIAGNOSIS — G40.209 PARTIAL SYMPTOMATIC EPILEPSY WITH COMPLEX PARTIAL SEIZURES, NOT INTRACTABLE, WITHOUT STATUS EPILEPTICUS (HCC): ICD-10-CM

## 2024-09-13 DIAGNOSIS — L70.0 ACNE VULGARIS: ICD-10-CM

## 2024-09-13 DIAGNOSIS — F84.0 AUTISM DISORDER: ICD-10-CM

## 2024-09-13 PROCEDURE — 3078F DIAST BP <80 MM HG: CPT | Performed by: FAMILY MEDICINE

## 2024-09-13 PROCEDURE — 3074F SYST BP LT 130 MM HG: CPT | Performed by: FAMILY MEDICINE

## 2024-09-13 PROCEDURE — 99213 OFFICE O/P EST LOW 20 MIN: CPT | Performed by: FAMILY MEDICINE

## 2024-09-13 ASSESSMENT — FIBROSIS 4 INDEX: FIB4 SCORE: 0.45

## 2024-09-13 NOTE — PROGRESS NOTES
Subjective:     CC:   Chief Complaint   Patient presents with    Follow-Up       HPI:   Cruzito presents today with caregiver due to the fact that he is having some emotional outbursts.  Caregiver says it is not all the time but it does occur.  On the controlled substance site I noticed that patient was given a prescription by her psychiatrist for Ativan.  The caregiver says he is running out it sounds like this medication which does not make sense on the medication list it looks like he has been getting that even though the prescription was written for 15 pills in late July.  Unfortunately all his medications are being prescribed by either psychiatrist Dr. Lyric Kelly and also neurologist Dr. Toth.  It looks like patient has not seen neurology in more than a year which I would recommend following up on.  Caregiver states that he did see me for his skin which was in December 2023 I did write a dermatology referral apparently they were not contacted on this even though patient does not have MyChart.  I will rewrite the referral again and did give the caregiver a phone number to referrals department so they can see when the appointment will be scheduled.  I recommend given them about 1 to 2 weeks before calling.    Past Medical History:   Diagnosis Date    Autism     non verbal since age 2 1/2 years     History of seizure        Social History     Tobacco Use    Smoking status: Never    Smokeless tobacco: Never   Vaping Use    Vaping status: Never Used   Substance Use Topics    Alcohol use: No    Drug use: No       Current Outpatient Medications Ordered in Epic   Medication Sig Dispense Refill    levETIRAcetam (KEPPRA) 500 MG Tab       traZODone (DESYREL) 50 MG Tab 50 mg.      ALPRAZolam (XANAX) 0.25 MG Tab       ARIPiprazole (ABILIFY) 2 MG tablet       ARIPiprazole (ABILIFY) 5 MG tablet       Nystatin Powder Apply 1 Application topically 3 times a day as needed (Rash). Indications: Rash      ketoconazole (NIZORAL) 2  % Cream Apply thin layer to affected area twice daily as needed for rash 60 g 2     No current Epic-ordered facility-administered medications on file.       Allergies:  Tree nuts food allergy    Health Maintenance: Completed    ROS:  Gen: no fevers/chills, patient has gained some weight since have last seen him  Pulm: no sob, no cough  GI: no nausea/vomiting, no diarrhea  : no dysuria  Neuro: no headaches, no numbness/tingling  Heme/Lymph: no easy bruising    Objective:     Exam:  /66 (BP Location: Right arm, Patient Position: Sitting, BP Cuff Size: Adult)   Pulse 86   Temp 36.7 °C (98.1 °F) (Temporal)   Resp 18   Ht 1.829 m (6')   Wt 81.7 kg (180 lb 3.2 oz)   SpO2 97%   BMI 24.44 kg/m²  Body mass index is 24.44 kg/m².    Gen: Alert and oriented, No apparent distress.  Skin: Warm and dry.  No obvious lesions.  Eyes: Sclera wnl Pupils normal in size  Lungs: Normal effort, CTA bilaterally, no wheezes, rhonchi, or rales  CV: Regular rate and rhythm. No murmurs, rubs, or gallops.  ABD: Soft non-tender no organomegaly  Musculoskeletal: Normal gait. No extremity cyanosis, clubbing, or edema.  Neuro: Oriented to pe  Psych: Mood is wnl.  Patient is not agitated and sit in there rocking back-and-forth      Assessment & Plan:     29 y.o. male with the following -     1. Autism disorder  I would recommend following up with Dr. Kelly    2. Partial symptomatic epilepsy with complex partial seizures, not intractable, without status epilepticus (HCC)  Patient should follow-up with Dr. Toth    3. Acne vulgaris  Dermatology referral was written.  I did give the caregiver the number to call for referrals department so she can get a number to call to make the dermatology appointment       Return in about 3 months (around 12/13/2024), or if symptoms worsen or fail to improve.    Please note that this dictation was created using voice recognition software. I have made every reasonable attempt to correct obvious errors, but  I expect that there are errors of grammar and possibly content that I did not discover before finalizing the note.

## 2024-09-23 DIAGNOSIS — G40.109 TEMPORAL LOBE EPILEPSY (HCC): ICD-10-CM

## 2024-09-23 RX ORDER — LEVETIRACETAM 500 MG/1
500 TABLET ORAL 2 TIMES DAILY
Qty: 60 TABLET | Refills: 0 | OUTPATIENT
Start: 2024-09-23

## 2024-09-24 RX ORDER — LEVETIRACETAM 500 MG/1
500 TABLET ORAL 2 TIMES DAILY
Qty: 180 TABLET | Refills: 0 | Status: SHIPPED | OUTPATIENT
Start: 2024-09-24

## 2024-09-25 ENCOUNTER — APPOINTMENT (OUTPATIENT)
Dept: NEUROLOGY | Facility: MEDICAL CENTER | Age: 29
End: 2024-09-25
Attending: PSYCHIATRY & NEUROLOGY
Payer: MEDICARE

## 2024-09-27 ENCOUNTER — TELEPHONE (OUTPATIENT)
Dept: HEALTH INFORMATION MANAGEMENT | Facility: OTHER | Age: 29
End: 2024-09-27
Payer: MEDICARE

## 2024-11-19 ENCOUNTER — APPOINTMENT (OUTPATIENT)
Dept: NEUROLOGY | Facility: MEDICAL CENTER | Age: 29
End: 2024-11-19
Attending: PSYCHIATRY & NEUROLOGY
Payer: MEDICARE

## 2024-11-22 DIAGNOSIS — G40.109 TEMPORAL LOBE EPILEPSY (HCC): ICD-10-CM

## 2024-11-22 RX ORDER — LEVETIRACETAM 500 MG/1
500 TABLET ORAL 2 TIMES DAILY
Qty: 180 TABLET | Refills: 0 | Status: SHIPPED | OUTPATIENT
Start: 2024-11-22 | End: 2025-02-20

## 2024-11-22 NOTE — TELEPHONE ENCOUNTER
Received request via: Pharmacy    Medication Name/Dosage   levETIRAcetam (KEPPRA) 500 MG Tab      When was medication last prescribed 9/24/2024    How many refills were previously provided 0    How many Refills does he patient have left from last prescription 0    Was the patient seen in the last year in this department? No   Date of last office visit 09/09/2022     Per last Neurology Office Visit, when was the date of next follow up visit set for?                          6 MONTHS  Date of office visit follow up request 3/9/2023     Does the patient have an upcoming appointment? Yes   If yes, when 1/8/2025             If no, schedule appointment SCHEDULED.    Does the patient have [facility] Plus and need 100 day supply (blood pressure, diabetes and cholesterol meds only)? Pt does have senior care plus.

## 2024-12-11 RX ORDER — NYSTATIN 100000 [USP'U]/G
POWDER TOPICAL
COMMUNITY
Start: 2024-11-14

## 2024-12-11 RX ORDER — TRAZODONE HYDROCHLORIDE 150 MG/1
TABLET ORAL
COMMUNITY
Start: 2024-10-30 | End: 2024-12-12

## 2024-12-11 RX ORDER — LORAZEPAM 1 MG/1
TABLET ORAL
COMMUNITY
Start: 2024-11-22

## 2024-12-11 RX ORDER — MIRTAZAPINE 7.5 MG/1
TABLET, FILM COATED ORAL
COMMUNITY
Start: 2024-11-22

## 2024-12-11 RX ORDER — ARIPIPRAZOLE 15 MG/1
TABLET ORAL
COMMUNITY
Start: 2024-11-22

## 2024-12-12 ENCOUNTER — OFFICE VISIT (OUTPATIENT)
Dept: MEDICAL GROUP | Facility: PHYSICIAN GROUP | Age: 29
End: 2024-12-12
Payer: MEDICARE

## 2024-12-12 ENCOUNTER — PATIENT OUTREACH (OUTPATIENT)
Dept: HEALTH INFORMATION MANAGEMENT | Facility: OTHER | Age: 29
End: 2024-12-12

## 2024-12-12 VITALS
HEIGHT: 72 IN | BODY MASS INDEX: 24.11 KG/M2 | RESPIRATION RATE: 16 BRPM | OXYGEN SATURATION: 97 % | DIASTOLIC BLOOD PRESSURE: 74 MMHG | WEIGHT: 178 LBS | HEART RATE: 104 BPM | SYSTOLIC BLOOD PRESSURE: 118 MMHG | TEMPERATURE: 98.7 F

## 2024-12-12 DIAGNOSIS — F80.9 COMMUNICATION PROBLEM: ICD-10-CM

## 2024-12-12 DIAGNOSIS — L70.0 ACNE VULGARIS: ICD-10-CM

## 2024-12-12 DIAGNOSIS — G40.209 PARTIAL SYMPTOMATIC EPILEPSY WITH COMPLEX PARTIAL SEIZURES, NOT INTRACTABLE, WITHOUT STATUS EPILEPTICUS (HCC): ICD-10-CM

## 2024-12-12 DIAGNOSIS — F84.0 AUTISM DISORDER: ICD-10-CM

## 2024-12-12 DIAGNOSIS — Z23 NEED FOR VACCINATION: ICD-10-CM

## 2024-12-12 DIAGNOSIS — E78.5 DYSLIPIDEMIA: ICD-10-CM

## 2024-12-12 PROCEDURE — 99214 OFFICE O/P EST MOD 30 MIN: CPT | Performed by: FAMILY MEDICINE

## 2024-12-12 PROCEDURE — 3074F SYST BP LT 130 MM HG: CPT | Performed by: FAMILY MEDICINE

## 2024-12-12 PROCEDURE — 3078F DIAST BP <80 MM HG: CPT | Performed by: FAMILY MEDICINE

## 2024-12-12 ASSESSMENT — FIBROSIS 4 INDEX: FIB4 SCORE: 0.45

## 2024-12-12 NOTE — PROGRESS NOTES
PCP asked for assistance I spoke to caregiver and gave her information that the legal paperwork on file needs to be updated to indicate that sister is the legal guardian for patient. She verbalized understanding and will get the new paperwork to us. Also, gave her information for flu shots and other vaccine [facility]. No other needs at this time.

## 2024-12-12 NOTE — PROGRESS NOTES
Subjective:     CC:   Chief Complaint   Patient presents with    Rash     Back X several years    Follow-Up       HPI:   Cruzito presents today with caregiver for a follow-up appointment.  On to scheduling his for annual it sounds like patient may be here for annual caregiver does not know why he is here.  Patient does have appointment in January to see neurology.  Caregiver reports patient is doing well and is not having any anger outbursts.  Patient does have a rash on his back I have ordered a dermatology referral it is unclear if he ever saw dermatology.  I did order this past summer lab work and it appears that patient never got his lab work done.    Past Medical History:   Diagnosis Date    Autism     non verbal since age 2 1/2 years     History of seizure        Social History     Tobacco Use    Smoking status: Never    Smokeless tobacco: Never   Vaping Use    Vaping status: Never Used   Substance Use Topics    Alcohol use: No    Drug use: No       Current Outpatient Medications Ordered in Epic   Medication Sig Dispense Refill    LORazepam (ATIVAN) 1 MG Tab       mirtazapine (REMERON) 7.5 MG tablet       ARIPiprazole (ABILIFY) 15 MG Tab       nystatin (MYCOSTATIN) powder       levETIRAcetam (KEPPRA) 500 MG Tab Take 1 Tablet by mouth 2 times a day for 90 days. 180 Tablet 0    Nystatin Powder Apply 1 Application topically 3 times a day as needed (Rash). Indications: Rash      ketoconazole (NIZORAL) 2 % Cream Apply thin layer to affected area twice daily as needed for rash 60 g 2     No current Epic-ordered facility-administered medications on file.     Family History   Problem Relation Age of Onset    Arthritis Mother         Rheumatoid    Arterial Aneurysm Father     Lung Disease Father     Heart Disease Father     Hypertension Father     Diabetes Maternal Grandmother     Lung Cancer Maternal Grandfather     Hypertension Paternal Grandfather     Multiple Sclerosis Paternal Grandfather    History reviewed. No  pertinent surgical history.    Allergies:  Tree nuts food allergy    Health Maintenance: Completed    ROS:  Gen: no changes in weight  ENT:  no hearing loss, no bloody nose  Pulm: no sob, no cough  CV: no chest pain, no palpitations  GI: no nausea/vomiting, no diarrhea  : no dysuria  Heme/Lymph: no easy bruising    Objective:     Exam:  /74 (BP Location: Right arm, Patient Position: Sitting, BP Cuff Size: Adult)   Pulse (!) 104   Temp 37.1 °C (98.7 °F) (Temporal)   Resp 16   Ht 1.829 m (6')   Wt 80.7 kg (178 lb)   SpO2 97%   BMI 24.14 kg/m²  Body mass index is 24.14 kg/m².    Gen: Alert and oriented, No apparent distress.  Skin: Warm and dry.  Patient does have some acne lesions and also a mild rash no signs of infection noted on his back.  Eyes: Sclera wnl Pupils normal in size  ENT: Canals wnl and TM are not red  Lungs: Normal effort, CTA bilaterally, no wheezes, rhonchi, or rales  CV: Regular rate and rhythm. No murmurs, rubs, or gallops.  ABD: Soft non-tender no organomegaly  Musculoskeletal: Normal gait. No extremity cyanosis, clubbing, or edema.  Patient is moving all his extremities well with no issues  Psych: Mood is wnl, patient is having humming    Labs: I did give the caregiver copies of the lab I would like him to get done I did inform her it is nonfasting    Assessment & Plan:     29 y.o. male with the following -     1. Autism disorder  Patient appears to be in his usual disposition.  There is no concerns from the caregiver of anger issues.    2. Communication problem  Patient unable to communicate well but can follow instructions when she was trying to help him put his jacket back on he was following instructions.    3. Partial symptomatic epilepsy with complex partial seizures, not intractable, without status epilepticus (HCC)  Patient to follow-up with neurology as planned apparently no issues with seizures  4. Acne vulgaris  I will on the note give him the phone number to call for  him to see dermatology.  They can address his acne along with the rash on his back.   but I expect that there are errors of grammar and possibly content that I did not discover before finalizing the note.      Follow-up in 4 months

## 2025-01-08 ENCOUNTER — OFFICE VISIT (OUTPATIENT)
Dept: NEUROLOGY | Facility: MEDICAL CENTER | Age: 30
End: 2025-01-08
Attending: PSYCHIATRY & NEUROLOGY
Payer: MEDICARE

## 2025-01-08 VITALS
HEART RATE: 100 BPM | WEIGHT: 177.03 LBS | HEIGHT: 69 IN | DIASTOLIC BLOOD PRESSURE: 74 MMHG | OXYGEN SATURATION: 95 % | BODY MASS INDEX: 26.22 KG/M2 | TEMPERATURE: 97.7 F | SYSTOLIC BLOOD PRESSURE: 112 MMHG | RESPIRATION RATE: 12 BRPM

## 2025-01-08 DIAGNOSIS — G40.109 TEMPORAL LOBE EPILEPSY (HCC): ICD-10-CM

## 2025-01-08 DIAGNOSIS — G40.209 PARTIAL SYMPTOMATIC EPILEPSY WITH COMPLEX PARTIAL SEIZURES, NOT INTRACTABLE, WITHOUT STATUS EPILEPTICUS (HCC): ICD-10-CM

## 2025-01-08 PROCEDURE — 3078F DIAST BP <80 MM HG: CPT | Performed by: PSYCHIATRY & NEUROLOGY

## 2025-01-08 PROCEDURE — 3074F SYST BP LT 130 MM HG: CPT | Performed by: PSYCHIATRY & NEUROLOGY

## 2025-01-08 PROCEDURE — 99212 OFFICE O/P EST SF 10 MIN: CPT | Performed by: PSYCHIATRY & NEUROLOGY

## 2025-01-08 PROCEDURE — 99214 OFFICE O/P EST MOD 30 MIN: CPT | Performed by: PSYCHIATRY & NEUROLOGY

## 2025-01-08 RX ORDER — LEVETIRACETAM 750 MG/1
750 TABLET ORAL 2 TIMES DAILY
Qty: 180 TABLET | Refills: 3 | Status: SHIPPED
Start: 2025-01-08 | End: 2025-01-08

## 2025-01-08 RX ORDER — LEVETIRACETAM 750 MG/1
750 TABLET ORAL 2 TIMES DAILY
Qty: 180 TABLET | Refills: 3 | Status: SHIPPED | OUTPATIENT
Start: 2025-01-08 | End: 2026-01-03

## 2025-01-08 ASSESSMENT — FIBROSIS 4 INDEX: FIB4 SCORE: 0.45

## 2025-01-09 NOTE — PROGRESS NOTES
"Chief Complaint   Patient presents with    Follow-Up     Partial symptomatic epilepsy with complex partial seizures, not intractable, without status epilepticus       History of present illness:  Cruzito Aguilar 27 y.o. male with developmental delay, nonverbal, and documented history of epilepsy presents with abnormal spells since May. He has had history of seizures since age 14 but has never been on antiepileptic therapy. Majority of the spells occur daily and are described as mumbling, pale skin, and shaking.  The mother is able to get him to drink water, after which the spells typically resolve but he is tired and lethargic.  There also are episodes about once a month of whole body shaking, one of which occurred when he was outside.   EEG was positive for ictal correlate of \"mumbling\", with left hemispheric onset seizure.   He was started on keppra 500mg twice daily after the EEG results.   After starting keppra, he has not had any further mumbling spells.     Past medical history:   Past Medical History:   Diagnosis Date    Autism     non verbal since age 2 1/2 years     History of seizure        Past surgical history:   No past surgical history on file.    Family history:   Family History   Problem Relation Age of Onset    Arthritis Mother         Rheumatoid    Arterial Aneurysm Father     Lung Disease Father     Heart Disease Father     Hypertension Father     Diabetes Maternal Grandmother     Lung Cancer Maternal Grandfather     Hypertension Paternal Grandfather     Multiple Sclerosis Paternal Grandfather        Social history:   Social History     Socioeconomic History    Marital status: Single     Spouse name: Not on file    Number of children: Not on file    Years of education: Not on file    Highest education level: Not on file   Occupational History    Not on file   Tobacco Use    Smoking status: Never    Smokeless tobacco: Never   Vaping Use    Vaping status: Never Used   Substance and Sexual Activity    " "Alcohol use: No    Drug use: No    Sexual activity: Not Currently   Other Topics Concern    Not on file   Social History Narrative    Not on file     Social Drivers of Health     Financial Resource Strain: Not on file   Food Insecurity: Not on file   Transportation Needs: Not on file   Physical Activity: Not on file   Stress: Not on file   Social Connections: Unknown (4/25/2024)    OASIS : Social Isolation     Frequency of experiencing loneliness or isolation: Patient unable to respond   Intimate Partner Violence: Not on file   Housing Stability: Not on file       Current medications:   Current Outpatient Medications   Medication    levetiracetam (KEPPRA) 750 MG tablet    LORazepam (ATIVAN) 1 MG Tab    mirtazapine (REMERON) 7.5 MG tablet    ARIPiprazole (ABILIFY) 15 MG Tab    nystatin (MYCOSTATIN) powder    Nystatin Powder    ketoconazole (NIZORAL) 2 % Cream     No current facility-administered medications for this visit.       Medication Allergy:  Allergies   Allergen Reactions    Tree Nuts Food Allergy        Physical examination:   Vitals:    01/08/25 1545   BP: 112/74   BP Location: Left arm   Patient Position: Sitting   BP Cuff Size: Adult   Pulse: 100   Resp: 12   Temp: 36.5 °C (97.7 °F)   TempSrc: Temporal   SpO2: 95%   Weight: 80.3 kg (177 lb 0.5 oz)   Height: 1.74 m (5' 8.5\")     Labs:  I reviewed the following labs personally:  None     Imaging:   This is an abnormal 23 hours ambulatory electroencephalogram recording in the awake, drowsy and sleep state.    1) the presence of frequent left temporal epileptiform activities were noted, at times LPDs suggests increased risk of focal onset seizure over this region.   2) 1 episode of \"mumbling \" was reported, with ictal correlate as possible left hemisphere onset electrographic seizure with duration ~60 seconds.     ASSESSMENT AND PLAN:  Problem List Items Addressed This Visit          Neurology Medicine Problems    Epilepsy (HCC)    Relevant Medications "    levetiracetam (KEPPRA) 750 MG tablet     Other Visit Diagnoses       Temporal lobe epilepsy (HCC)        Relevant Medications    levetiracetam (KEPPRA) 750 MG tablet              1. Partial symptomatic epilepsy with complex partial seizures, not intractable, without status epilepticus (HCC)    2. Temporal lobe epilepsy (HCC)  - levetiracetam (KEPPRA) 750 MG tablet; Take 1 Tablet by mouth 2 times a day for 360 days.  Dispense: 180 Tablet; Refill: 3    29-year-old male with developmental delay, autism, nonverbal.  He was having abnormal spells where he was mumbling and shaking.  An ambulatory EEG captured one of the spells and it was epileptic with left hemispheric onset.      Recently, the patient had a convulsive episode that was witnessed by his new caregiver.  She described that he was stiff and rotated on his left side in bed.  Stiffening and the shaking lasted for approximately 10 seconds, and after this he returned to sleep.    Patient is nonverbal and is unable to provide a description of the event himself.  I have provided instructions to increase Keppra to 750 mg twice daily, assuming that this is a epileptic seizure.     FOLLOW-UP:   Return in about 6 months (around 7/8/2025) for follow-up with Dr. Doss.    Total time spent for the day for this patient unrelated to procedure time is: 15 minutes. I spent 10 minutes in face to face time and I spent 2 minutes pre-charting and 3 minutes in post-visit documentation.      Dr. Toni Toth D.O.  [facility] Neurology  Movement Disorders Specialist

## 2025-04-14 ENCOUNTER — OFFICE VISIT (OUTPATIENT)
Dept: MEDICAL GROUP | Facility: PHYSICIAN GROUP | Age: 30
End: 2025-04-14
Payer: MEDICARE

## 2025-04-14 VITALS
HEIGHT: 69 IN | OXYGEN SATURATION: 96 % | WEIGHT: 180.7 LBS | HEART RATE: 107 BPM | TEMPERATURE: 97.6 F | BODY MASS INDEX: 26.76 KG/M2 | SYSTOLIC BLOOD PRESSURE: 118 MMHG | RESPIRATION RATE: 18 BRPM | DIASTOLIC BLOOD PRESSURE: 74 MMHG

## 2025-04-14 DIAGNOSIS — E78.5 DYSLIPIDEMIA: ICD-10-CM

## 2025-04-14 DIAGNOSIS — E55.9 VITAMIN D DEFICIENCY: ICD-10-CM

## 2025-04-14 DIAGNOSIS — R35.0 URINARY FREQUENCY: ICD-10-CM

## 2025-04-14 PROCEDURE — 99214 OFFICE O/P EST MOD 30 MIN: CPT | Performed by: FAMILY MEDICINE

## 2025-04-14 PROCEDURE — 3078F DIAST BP <80 MM HG: CPT | Performed by: FAMILY MEDICINE

## 2025-04-14 PROCEDURE — 3074F SYST BP LT 130 MM HG: CPT | Performed by: FAMILY MEDICINE

## 2025-04-14 ASSESSMENT — FIBROSIS 4 INDEX: FIB4 SCORE: 0.46

## 2025-07-10 ENCOUNTER — OFFICE VISIT (OUTPATIENT)
Dept: NEUROLOGY | Facility: MEDICAL CENTER | Age: 30
End: 2025-07-10
Attending: STUDENT IN AN ORGANIZED HEALTH CARE EDUCATION/TRAINING PROGRAM
Payer: MEDICARE

## 2025-07-10 ENCOUNTER — HOSPITAL ENCOUNTER (OUTPATIENT)
Facility: MEDICAL CENTER | Age: 30
End: 2025-07-10
Attending: FAMILY MEDICINE
Payer: MEDICARE

## 2025-07-10 VITALS
SYSTOLIC BLOOD PRESSURE: 120 MMHG | HEIGHT: 69 IN | BODY MASS INDEX: 26.58 KG/M2 | WEIGHT: 179.45 LBS | DIASTOLIC BLOOD PRESSURE: 70 MMHG | RESPIRATION RATE: 28 BRPM | HEART RATE: 109 BPM | OXYGEN SATURATION: 96 %

## 2025-07-10 DIAGNOSIS — R35.0 URINARY FREQUENCY: ICD-10-CM

## 2025-07-10 DIAGNOSIS — G40.109 TEMPORAL LOBE EPILEPSY (HCC): Primary | ICD-10-CM

## 2025-07-10 DIAGNOSIS — F41.9 ANXIETY: ICD-10-CM

## 2025-07-10 LAB
APPEARANCE UR: CLEAR
BACTERIA #/AREA URNS HPF: NORMAL /HPF
BILIRUB UR QL STRIP.AUTO: NEGATIVE
CASTS URNS QL MICRO: NORMAL /LPF (ref 0–2)
COLOR UR: YELLOW
EPITHELIAL CELLS 1715: NORMAL /HPF (ref 0–5)
GLUCOSE UR STRIP.AUTO-MCNC: NEGATIVE MG/DL
KETONES UR STRIP.AUTO-MCNC: NEGATIVE MG/DL
LEUKOCYTE ESTERASE UR QL STRIP.AUTO: NEGATIVE
MICRO URNS: ABNORMAL
NITRITE UR QL STRIP.AUTO: NEGATIVE
PH UR STRIP.AUTO: 7 [PH] (ref 5–8)
PROT UR QL STRIP: 30 MG/DL
RBC # URNS HPF: NORMAL /HPF (ref 0–2)
RBC UR QL AUTO: NEGATIVE
SP GR UR STRIP.AUTO: 1.02
UROBILINOGEN UR STRIP.AUTO-MCNC: 0.2 EU/DL
WBC #/AREA URNS HPF: NORMAL /HPF

## 2025-07-10 PROCEDURE — 3078F DIAST BP <80 MM HG: CPT | Performed by: STUDENT IN AN ORGANIZED HEALTH CARE EDUCATION/TRAINING PROGRAM

## 2025-07-10 PROCEDURE — 99214 OFFICE O/P EST MOD 30 MIN: CPT | Performed by: STUDENT IN AN ORGANIZED HEALTH CARE EDUCATION/TRAINING PROGRAM

## 2025-07-10 PROCEDURE — 3074F SYST BP LT 130 MM HG: CPT | Performed by: STUDENT IN AN ORGANIZED HEALTH CARE EDUCATION/TRAINING PROGRAM

## 2025-07-10 PROCEDURE — 81001 URINALYSIS AUTO W/SCOPE: CPT

## 2025-07-10 RX ORDER — LORAZEPAM 1 MG/1
1 TABLET ORAL
Qty: 30 TABLET | Refills: 5 | Status: SHIPPED | OUTPATIENT
Start: 2025-07-10 | End: 2026-01-06

## 2025-07-10 RX ORDER — LEVETIRACETAM 750 MG/1
750 TABLET ORAL 2 TIMES DAILY
Qty: 180 TABLET | Refills: 3 | Status: SHIPPED | OUTPATIENT
Start: 2025-07-10 | End: 2026-07-05

## 2025-07-10 ASSESSMENT — PATIENT HEALTH QUESTIONNAIRE - PHQ9: CLINICAL INTERPRETATION OF PHQ2 SCORE: 0

## 2025-07-10 NOTE — PROGRESS NOTES
"[facility]  Follow up visit    Patient name: Cruzito Aguilar  YOB: 1995  MRN: [id number]  Date of visit: 7/10/2025     Background:    Cruzito Aguilar is a 30 y.o. man with a history of developmental delay, nonverbal, and epilepsy who is being seen in transfer of care for seizures.     Per Dr. Toth's last note:  \"He has had history of seizures since age 14 but has never been on antiepileptic therapy. Majority of the spells occur daily and are described as mumbling, pale skin, and shaking.  The mother is able to get him to drink water, after which the spells typically resolve but he is tired and lethargic.  There also are episodes about once a month of whole body shaking, one of which occurred when he was outside.   EEG was positive for ictal correlate of \"mumbling\", with left hemispheric onset seizure.   He was started on keppra 500mg twice daily after the EEG results.   After starting keppra, he has not had any further mumbling spells.\"    Dose of Keppra increased in 1/8/2025 and he has been seizure free.     Seizures occurred only at night.     He is able to use the bathroom, brush his teeth, shower and pick out clothes for himself and change them. He is attentive to grooming himself without issue. He understands when he is told that he needs to shower or brush his teeth.     Interval history:  Cruzito is accompanied to the visit by his , Steph Calvin.     He lives at [place]. His mother was his legal guardian listed in our system but she has passed away.    No medication side effects.     Takes Ativan 1 mg nightly for sleep. He gets anxious periodically during the day and Steph is hoping he can have another prescription for PRN Ativan.    Driving: never    Mood: no issues       7/10/2025    10:40 AM   PHQ-9 Screening   Little interest or pleasure in doing things 0 - not at all   Feeling down, depressed, or hopeless 0 - not at all   PHQ-2 Total Score 0 "           Current Medications: Current Medications[1]    Allergies: Allergies[2]      Physical Exam:   Ambulatory Vitals  Vitals:    07/10/25 1027   BP: 120/70   Pulse: (!) 109   Resp: (!) 28   SpO2: 96%       Constitutional: Well-developed, well-nourished, good hygiene. Appears stated age.  Respiratory: normal respiratory effort  Skin: Warm, dry, intact. No rashes observed.  Neurologic:   Mental Status: Awake, alert, oriented to self and situation.   Speech: nonverbal   Memory: Able to recall recent and remote events accurately.    Concentration: Able to follow simple commands   Fund of Knowledge: Appropriate.   Cranial Nerves:    CN II: PERRL     CN III, IV, VI: EOMI without nystagmus    CN VII: (+) L lower facial weakness (baseline)    CN VIII: Hearing intact to voice    CN IX and X: Palate elevates symmetrically, gag reflex not tested    CN XI: Symmetric shoulder shrug     CN XII: Tongue midline   Motor: Moving all extremities equally    Coordination: No evidence of past-pointing on finger to nose testing, no dysdiadochokinesia. Heel to shin grossly intact.    Gait: ambulates steadily without assistive device    Movements: No resting tremors or abnormal movements observed.     Studies:      Labs reviewed:      Imaging:     [facility] 9/22/2009  FINDINGS:  The calvariae are normal in appearance.  The mastoid air cells  and the visualized paranasal sinuses are clear.  The brain is normal in  appearance without evidence for mass, hemorrhage, or CT evidence for  infarction.  The ventricular system and basilar cisterns are normal in  size and position.  There are no extra-axial fluid collections present.     Impression  IMPRESSION:      NEGATIVE NONCONTRAST CT SCAN OF THE HEAD.      EEG Results:   Ambulatory EEG 8/2022:  INTERPRETATION:   This is an abnormal 23 hours ambulatory electroencephalogram recording in the awake, drowsy and sleep state.    1) the presence of frequent left temporal epileptiform activities were  "noted, at times LPDs suggests increased risk of focal onset seizure over this region.   2) total 4 episodes of \"mumbling \" were reported, 3 episodes with ictal correlate, 2 with possible left hemisphere onset and 1 with  right hemisphere onset. 1 did not have ictal EEG correlate, instead transition from sleep to wakefulness was noted.   3)1 electrographic seizure noted at  19:22, onset was unclear due to excessive artifact, ~50 seconds, no clinical report.   4) Sinus tachycardia with all 4 electrographic seizures. Clinical correlation is recommended.        Assessment/Plan:   Cruzito Aguilar is a 30 y.o. man with a history of developmental delay (nonverbal) and seizures being seen in transfer of care.     Seizure semiology when occurring was \"mumbling\" and shaking. The ambulatory EEG from 2022 captured these events, with L temporal lobe localization. It seems these electrographic and clinical seizures were captured at a time when the patient was not yet on anti-seizure medication. Keppra has been effective, initially at minimizing seizures, and subsequently at stopping them when the dose was increased in 1/2025 to 750 mg BID. No changes were made today. Advised facility to check Keppra level at next lab draw.     For occasional episodes of anxiety the care provider requested additional PRN 1 mg Ativan to be given in the daytime, as he has 1 mg nightly Ativan prescribed by his primary care. A new prescription was sent.     1. Temporal lobe epilepsy (HCC)  - levetiracetam (KEPPRA) 750 MG tablet; Take 1 Tablet by mouth 2 times a day for 360 days.  Dispense: 180 Tablet; Refill: 3  - KEPPRA; Future  - LORazepam (ATIVAN) 1 MG Tab; Take 1 Tablet by mouth 1 time a day as needed for Anxiety for up to 180 days. Indications: Feeling Anxious  Dispense: 30 Tablet; Refill: 5    2. Anxiety   - LORazepam (ATIVAN) 1 MG Tab; Take 1 Tablet by mouth 1 time a day as needed for Anxiety for up to 180 days. Indications: Feeling Anxious  " Dispense: 30 Tablet; Refill: 5        Follow up in 6 months.       Keara Doss M.D.   Diplomate, Neurology with Special Qualification in Epilepsy, American Board of Psychiatry and Neurology   of Clinical Neurology, [facility]      During today's encounter we discussed available treatment options and their individual side effect profiles. Total encounter time caring for patient today 34 minutes.              [1]   Current Outpatient Medications:     levetiracetam (KEPPRA) 750 MG tablet, Take 1 Tablet by mouth 2 times a day for 360 days., Disp: 180 Tablet, Rfl: 3    LORazepam (ATIVAN) 1 MG Tab, Take 1 Tablet by mouth 1 time a day as needed for Anxiety for up to 180 days. Indications: Feeling Anxious, Disp: 30 Tablet, Rfl: 5    mirtazapine (REMERON) 7.5 MG tablet, Take 7.5 mg by mouth every evening., Disp: , Rfl:     ARIPiprazole (ABILIFY) 15 MG Tab, Take 15 mg by mouth every day., Disp: , Rfl:     Nystatin Powder, Apply 1 Application topically 3 times a day as needed (Rash). Indications: Rash, Disp: , Rfl:     ketoconazole (NIZORAL) 2 % Cream, Apply thin layer to affected area twice daily as needed for rash, Disp: 60 g, Rfl: 2  [2]   Allergies  Allergen Reactions    Tree Nuts Food Allergy

## 2025-07-17 ENCOUNTER — OFFICE VISIT (OUTPATIENT)
Dept: MEDICAL GROUP | Facility: PHYSICIAN GROUP | Age: 30
End: 2025-07-17
Payer: MEDICARE

## 2025-07-17 VITALS
SYSTOLIC BLOOD PRESSURE: 110 MMHG | RESPIRATION RATE: 16 BRPM | OXYGEN SATURATION: 98 % | HEART RATE: 95 BPM | DIASTOLIC BLOOD PRESSURE: 62 MMHG | BODY MASS INDEX: 26.38 KG/M2 | WEIGHT: 178.1 LBS | TEMPERATURE: 98.8 F | HEIGHT: 69 IN

## 2025-07-17 DIAGNOSIS — E55.9 VITAMIN D DEFICIENCY: Primary | ICD-10-CM

## 2025-07-17 DIAGNOSIS — G40.209 PARTIAL SYMPTOMATIC EPILEPSY WITH COMPLEX PARTIAL SEIZURES, NOT INTRACTABLE, WITHOUT STATUS EPILEPTICUS (HCC): ICD-10-CM

## 2025-07-17 DIAGNOSIS — E78.5 DYSLIPIDEMIA: ICD-10-CM

## 2025-07-17 PROCEDURE — 3074F SYST BP LT 130 MM HG: CPT | Performed by: FAMILY MEDICINE

## 2025-07-17 PROCEDURE — 3078F DIAST BP <80 MM HG: CPT | Performed by: FAMILY MEDICINE

## 2025-07-17 PROCEDURE — 99213 OFFICE O/P EST LOW 20 MIN: CPT | Performed by: FAMILY MEDICINE

## 2025-07-17 NOTE — PROGRESS NOTES
"Subjective:     CC:   Chief Complaint   Patient presents with    Follow-Up     Lab follow up        HPI:   Cruzito presents today with caregiver for follow-up    Past Medical History[1]    Social History[2]    Current Medications and Prescriptions Ordered in Epic[3]    Allergies:  Tree nuts food allergy    Health Maintenance: Completed    ROS:  Gen: no fevers/chills, no changes in weight  Eyes: no changes in vision  ENT: no sore throat, no hearing loss, no bloody nose  Pulm: no sob, no cough  CV: no chest pain, no palpitations  GI: no nausea/vomiting, no diarrhea  : no dysuria  Neuro: no headaches, no numbness/tingling  Heme/Lymph: no easy bruising    Objective:     Exam:  /62   Pulse 95   Temp 37.1 °C (98.8 °F) (Temporal)   Resp 16   Ht 1.74 m (5' 8.5\")   Wt 80.8 kg (178 lb 1.6 oz)   SpO2 98%   BMI 26.69 kg/m²  Body mass index is 26.69 kg/m².    Gen: Alert and oriented, No apparent distress.  Skin: Warm and dry.  No obvious lesions.  Eyes: Sclera wnl Pupils normal in size  Lungs: Normal effort, CTA bilaterally, no wheezes, rhonchi, or rales  CV: Regular rate and rhythm. No murmurs, rubs, or gallops.  Musculoskeletal: Normal gait. No extremity cyanosis, clubbing, or edema.  Psych: Mood is wnl       Assessment & Plan:     30 y.o. male with the following -     1. Vitamin D deficiency  I did recommend getting some lab work done I have previously ordered caregiver will make sure he gets it done tomorrow.    2. Dyslipidemia  Recommend checking his labs caregiver will make sure he gets them done tomorrow    3. Partial symptomatic epilepsy with complex partial seizures, not intractable, without status epilepticus (HCC)  Patient did see neurology they recommend getting a Keppra level patient can get that done tomorrow.  Patient has not had a seizure in over 7 months       Return in about 4 months (around 11/17/2025), or if symptoms worsen or fail to improve.  Any of the lab work is abnormal we will contact " caregiver on the results.    Please note that this dictation was created using voice recognition software. I have made every reasonable attempt to correct obvious errors, but I expect that there are errors of grammar and possibly content that I did not discover before finalizing the note.         [1]   Past Medical History:  Diagnosis Date    Autism     non verbal since age 2 1/2 years     History of seizure    [2]   Social History  Tobacco Use    Smoking status: Never    Smokeless tobacco: Never   Vaping Use    Vaping status: Never Used   Substance Use Topics    Alcohol use: No    Drug use: No   [3]   Current Outpatient Medications Ordered in Epic   Medication Sig Dispense Refill    levetiracetam (KEPPRA) 750 MG tablet Take 1 Tablet by mouth 2 times a day for 360 days. 180 Tablet 3    LORazepam (ATIVAN) 1 MG Tab Take 1 Tablet by mouth 1 time a day as needed for Anxiety for up to 180 days. Indications: Feeling Anxious 30 Tablet 5    mirtazapine (REMERON) 7.5 MG tablet Take 7.5 mg by mouth every evening.      ARIPiprazole (ABILIFY) 15 MG Tab Take 15 mg by mouth every day.      Nystatin Powder Apply 1 Application topically 3 times a day as needed (Rash). Indications: Rash      ketoconazole (NIZORAL) 2 % Cream Apply thin layer to affected area twice daily as needed for rash 60 g 2     No current Epic-ordered facility-administered medications on file.

## 2025-07-18 ENCOUNTER — HOSPITAL ENCOUNTER (OUTPATIENT)
Dept: LAB | Facility: MEDICAL CENTER | Age: 30
End: 2025-07-18
Attending: STUDENT IN AN ORGANIZED HEALTH CARE EDUCATION/TRAINING PROGRAM
Payer: MEDICARE

## 2025-07-18 DIAGNOSIS — E78.5 DYSLIPIDEMIA: ICD-10-CM

## 2025-07-18 DIAGNOSIS — E55.9 VITAMIN D DEFICIENCY: ICD-10-CM

## 2025-07-18 DIAGNOSIS — G40.109 TEMPORAL LOBE EPILEPSY (HCC): ICD-10-CM

## 2025-07-18 DIAGNOSIS — R35.0 URINARY FREQUENCY: ICD-10-CM

## 2025-07-18 LAB
25(OH)D3 SERPL-MCNC: 27 NG/ML (ref 30–100)
ALBUMIN SERPL BCP-MCNC: 4.7 G/DL (ref 3.2–4.9)
ALBUMIN/GLOB SERPL: 1.7 G/DL
ALP SERPL-CCNC: 46 U/L (ref 30–99)
ALT SERPL-CCNC: 26 U/L (ref 2–50)
ANION GAP SERPL CALC-SCNC: 11 MMOL/L (ref 7–16)
AST SERPL-CCNC: 23 U/L (ref 12–45)
BASOPHILS # BLD AUTO: 0.2 % (ref 0–1.8)
BASOPHILS # BLD: 0.02 K/UL (ref 0–0.12)
BILIRUB SERPL-MCNC: 0.5 MG/DL (ref 0.1–1.5)
BUN SERPL-MCNC: 14 MG/DL (ref 8–22)
CALCIUM ALBUM COR SERPL-MCNC: 8.5 MG/DL (ref 8.5–10.5)
CALCIUM SERPL-MCNC: 9.1 MG/DL (ref 8.5–10.5)
CHLORIDE SERPL-SCNC: 106 MMOL/L (ref 96–112)
CHOLEST SERPL-MCNC: 128 MG/DL (ref 100–199)
CO2 SERPL-SCNC: 24 MMOL/L (ref 20–33)
CREAT SERPL-MCNC: 0.96 MG/DL (ref 0.5–1.4)
EOSINOPHIL # BLD AUTO: 0.31 K/UL (ref 0–0.51)
EOSINOPHIL NFR BLD: 3.8 % (ref 0–6.9)
ERYTHROCYTE [DISTWIDTH] IN BLOOD BY AUTOMATED COUNT: 37.6 FL (ref 35.9–50)
FASTING STATUS PATIENT QL REPORTED: NORMAL
GFR SERPLBLD CREATININE-BSD FMLA CKD-EPI: 109 ML/MIN/1.73 M 2
GLOBULIN SER CALC-MCNC: 2.8 G/DL (ref 1.9–3.5)
GLUCOSE SERPL-MCNC: 87 MG/DL (ref 65–99)
HCT VFR BLD AUTO: 48.4 % (ref 42–52)
HDLC SERPL-MCNC: 34 MG/DL
HGB BLD-MCNC: 15.9 G/DL (ref 14–18)
IMM GRANULOCYTES # BLD AUTO: 0.02 K/UL (ref 0–0.11)
IMM GRANULOCYTES NFR BLD AUTO: 0.2 % (ref 0–0.9)
LDLC SERPL CALC-MCNC: 67 MG/DL
LYMPHOCYTES # BLD AUTO: 1.66 K/UL (ref 1–4.8)
LYMPHOCYTES NFR BLD: 20.2 % (ref 22–41)
MCH RBC QN AUTO: 28 PG (ref 27–33)
MCHC RBC AUTO-ENTMCNC: 32.9 G/DL (ref 32.3–36.5)
MCV RBC AUTO: 85.2 FL (ref 81.4–97.8)
MONOCYTES # BLD AUTO: 0.63 K/UL (ref 0–0.85)
MONOCYTES NFR BLD AUTO: 7.7 % (ref 0–13.4)
NEUTROPHILS # BLD AUTO: 5.56 K/UL (ref 1.82–7.42)
NEUTROPHILS NFR BLD: 67.9 % (ref 44–72)
NRBC # BLD AUTO: 0 K/UL
NRBC BLD-RTO: 0 /100 WBC (ref 0–0.2)
PLATELET # BLD AUTO: 250 K/UL (ref 164–446)
PMV BLD AUTO: 11.2 FL (ref 9–12.9)
POTASSIUM SERPL-SCNC: 4.2 MMOL/L (ref 3.6–5.5)
PROT SERPL-MCNC: 7.5 G/DL (ref 6–8.2)
RBC # BLD AUTO: 5.68 M/UL (ref 4.7–6.1)
SODIUM SERPL-SCNC: 141 MMOL/L (ref 135–145)
TRIGL SERPL-MCNC: 133 MG/DL (ref 0–149)
WBC # BLD AUTO: 8.2 K/UL (ref 4.8–10.8)

## 2025-07-18 PROCEDURE — 36415 COLL VENOUS BLD VENIPUNCTURE: CPT

## 2025-07-18 PROCEDURE — 80061 LIPID PANEL: CPT

## 2025-07-18 PROCEDURE — 80177 DRUG SCRN QUAN LEVETIRACETAM: CPT

## 2025-07-18 PROCEDURE — 82306 VITAMIN D 25 HYDROXY: CPT

## 2025-07-18 PROCEDURE — 85025 COMPLETE CBC W/AUTO DIFF WBC: CPT

## 2025-07-18 PROCEDURE — 80053 COMPREHEN METABOLIC PANEL: CPT

## 2025-07-21 LAB — LEVETIRACETAM SERPL-MCNC: 9.6 UG/ML (ref 10–40)

## 2025-07-24 ENCOUNTER — TELEPHONE (OUTPATIENT)
Dept: HEALTH INFORMATION MANAGEMENT | Facility: OTHER | Age: 30
End: 2025-07-24
Payer: MEDICARE